# Patient Record
Sex: FEMALE | Race: WHITE | NOT HISPANIC OR LATINO | Employment: STUDENT | URBAN - METROPOLITAN AREA
[De-identification: names, ages, dates, MRNs, and addresses within clinical notes are randomized per-mention and may not be internally consistent; named-entity substitution may affect disease eponyms.]

---

## 2017-03-13 ENCOUNTER — TELEPHONE (OUTPATIENT)
Dept: FAMILY MEDICINE | Facility: CLINIC | Age: 22
End: 2017-03-13

## 2017-03-13 NOTE — TELEPHONE ENCOUNTER
Patient states she went to [facility]  in [place] for swollen tonsils on Sat 3/11/17.  She also states she got a steroid shot and amoxicillin was prescribed.  Advised patient to finish all of her medication.  Patient states urgent care told her to follow up with her PCP.  I explained to patient Dr. Dsouza is only seeing urgent care and appt will be canceled. Patient verbalized understanding.

## 2017-04-12 ENCOUNTER — TELEPHONE (OUTPATIENT)
Dept: FAMILY MEDICINE | Facility: CLINIC | Age: 22
End: 2017-04-12

## 2017-04-12 NOTE — TELEPHONE ENCOUNTER
----- Message from RT Kendra sent at 4/12/2017  4:04 PM CDT -----  Contact: pt    Called pod, pt , returned missed call, thanks.

## 2017-04-12 NOTE — TELEPHONE ENCOUNTER
Returned patient call. No answer. Left message for patient to call back and schedule an appointment

## 2017-04-12 NOTE — TELEPHONE ENCOUNTER
Returned patient call. Advised [facility] has no available appointments on Monday neither did the [facility]. Offered an appointment on Tuesday at the [place] location and patient accepted. Appointment scheduled

## 2017-04-12 NOTE — TELEPHONE ENCOUNTER
----- Message from Antonio Cortes sent at 4/12/2017  3:22 PM CDT -----  Contact: pt  Pt is requesting an appt for Monday(was in ER and not sure what was DX)  Call Back#[phone]  Thanks

## 2017-04-18 ENCOUNTER — OFFICE VISIT (OUTPATIENT)
Dept: FAMILY MEDICINE | Facility: CLINIC | Age: 22
End: 2017-04-18
Payer: COMMERCIAL

## 2017-04-18 ENCOUNTER — DOCUMENTATION ONLY (OUTPATIENT)
Dept: FAMILY MEDICINE | Facility: CLINIC | Age: 22
End: 2017-04-18

## 2017-04-18 VITALS
TEMPERATURE: 98 F | HEIGHT: 65 IN | WEIGHT: 112.19 LBS | SYSTOLIC BLOOD PRESSURE: 104 MMHG | BODY MASS INDEX: 18.69 KG/M2 | DIASTOLIC BLOOD PRESSURE: 73 MMHG | HEART RATE: 99 BPM

## 2017-04-18 DIAGNOSIS — K58.9 IRRITABLE BOWEL SYNDROME, UNSPECIFIED TYPE: Primary | ICD-10-CM

## 2017-04-18 PROCEDURE — 99213 OFFICE O/P EST LOW 20 MIN: CPT | Mod: S$GLB,,, | Performed by: NURSE PRACTITIONER

## 2017-04-18 PROCEDURE — 1160F RVW MEDS BY RX/DR IN RCRD: CPT | Mod: S$GLB,,, | Performed by: NURSE PRACTITIONER

## 2017-04-18 RX ORDER — DICYCLOMINE HYDROCHLORIDE 10 MG/1
10 CAPSULE ORAL 4 TIMES DAILY PRN
Qty: 120 CAPSULE | Refills: 5 | Status: SHIPPED | OUTPATIENT
Start: 2017-04-18 | End: 2017-05-19

## 2017-04-18 NOTE — PROGRESS NOTES
Subjective:       Patient ID: Elvia Alvarez is a 22 y.o. female.    Chief Complaint: ER follow-up  Had a diarrhea and nausea bout for 2 weeks, was seen in [facility] ED, no treatment given, was tested for C diff, but it came back negative, CBC and CMP were also normal. Had U/A done and it was normal as well. She is better now, denies any further diarrhea or cramping, but does have chronic stomach cramping for her whole life, worse when she is very stressed. It is accompanied by bloating. She has never taken anything for it, but when she lays down on her stomach for about an hour or so, it goes away. No other family members have stomach issues.    HPI  Review of Systems   Gastrointestinal: Positive for abdominal pain and blood in stool. Negative for constipation and diarrhea.       Objective:      Physical Exam   Constitutional: She is oriented to person, place, and time. She appears well-developed and well-nourished. No distress.   HENT:   Head: Normocephalic and atraumatic.   Eyes: Conjunctivae are normal. Right eye exhibits no discharge. Left eye exhibits no discharge. No scleral icterus.   Cardiovascular: Normal rate, regular rhythm and normal heart sounds.  Exam reveals no gallop and no friction rub.    No murmur heard.  Pulmonary/Chest: Effort normal and breath sounds normal. No respiratory distress. She has no wheezes. She has no rales.   Abdominal: Soft. Bowel sounds are normal. She exhibits no distension and no mass. There is no tenderness. There is no rebound and no guarding.   Neurological: She is alert and oriented to person, place, and time.   Skin: Skin is warm and dry. She is not diaphoretic.   Psychiatric: She has a normal mood and affect. Her behavior is normal.   Nursing note and vitals reviewed.      Assessment:       1. Irritable bowel syndrome, unspecified type        Plan:       Irritable bowel syndrome, unspecified type  -     dicyclomine (BENTYL) 10 MG capsule; Take 1 capsule (10 mg total) by  mouth 4 (four) times daily as needed.  Dispense: 120 capsule; Refill: 5       Probable infectious colitis from a virus. If any change in bowel habit or any further blood in the stool, will refer to GI.

## 2017-04-18 NOTE — MR AVS SNAPSHOT
[facility]  [address] [address] [address]  Phone: [phone]  Fax: [phone]                  Elvia Alvarez   2017 1:00 PM   Office Visit    Description:  Female : 1995   Provider:  JOELLEN Louis   Department:  [facility]           Reason for Visit     ER follow-up           Diagnoses this Visit        Comments    Irritable bowel syndrome, unspecified type    -  Primary            To Do List           Future Appointments        Provider Department Dept Phone    2017 1:15 PM Freddie Hayes III, MD [facility] - Otorhinolaryngology [phone]      Goals (5 Years of Data)     None      Follow-Up and Disposition     Return if symptoms worsen or fail to improve.    Follow-up and Disposition History       These Medications        Disp Refills Start End    dicyclomine (BENTYL) 10 MG capsule 120 capsule 5 2017    Take 1 capsule (10 mg total) by mouth 4 (four) times daily as needed. - Oral    Pharmacy: [facility]/pharmacy #[id number] - [place], [place] [address] Ph #: [phone]         [facility]     [facility] On Call Nurse Care Line -  Assistance  Unless otherwise directed by your provider, please contact [facility], our nurse care line that is available for  assistance.     Registered nurses in the [facility] provide: appointment scheduling, clinical advisement, health education, and other advisory services.  Call: [phone] (toll free)               Medications           Message regarding Medications     Verify the changes and/or additions to your medication regime listed below are the same as discussed with your clinician today.  If any of these changes or additions are incorrect, please notify your healthcare provider.        START taking these NEW medications        Refills    dicyclomine (BENTYL) 10 MG capsule 5    Sig: Take 1 capsule (10 mg total) by mouth 4 (four) times  "daily as needed.    Class: Normal    Route: Oral           Verify that the below list of medications is an accurate representation of the medications you are currently taking.  If none reported, the list may be blank. If incorrect, please contact your healthcare provider. Carry this list with you in case of emergency.           Current Medications     SRONYX 0.1-20 mg-mcg per tablet Take 1 tablet by mouth once daily.    dicyclomine (BENTYL) 10 MG capsule Take 1 capsule (10 mg total) by mouth 4 (four) times daily as needed.           Clinical Reference Information           Your Vitals Were     BP Pulse Temp Height Weight Last Period    104/73 (BP Location: Right arm, Patient Position: Sitting, BP Method: Automatic) 99 98.1 °F (36.7 °C) (Oral) 5' 5" (1.651 m) 50.9 kg (112 lb 3.2 oz) 03/22/2017    BMI                18.67 kg/m2          Blood Pressure          Most Recent Value    BP  104/73      Allergies as of 4/18/2017     No Known Allergies      Immunizations Administered on Date of Encounter - 4/18/2017     None      Language Assistance Services     ATTENTION: Language assistance services are available, free of charge. Please call [phone].      ATENCIÓN: Si habla español, tiene a sellers disposición servicios gratuitos de asistencia lingüística. Llame al [phone].     MICHAEL Ý: N?u b?n nói Ti?ng Vi?t, có các d?ch v? h? tr? ngôn ng? mi?n phí dành cho b?n. G?i s? [phone].         [facility] complies with applicable Federal civil rights laws and does not discriminate on the basis of race, color, national origin, age, disability, or sex.        "

## 2017-04-18 NOTE — PROGRESS NOTES
Health Maintenance Due   Topic Date Due    HPV Vaccines (1 of 3 - Female 3 Dose Series) 03/09/2005    TETANUS VACCINE  03/09/2012    Pap Smear  03/09/2015    Influenza Vaccine  08/01/2016

## 2017-05-04 ENCOUNTER — DOCUMENTATION ONLY (OUTPATIENT)
Dept: FAMILY MEDICINE | Facility: CLINIC | Age: 22
End: 2017-05-04

## 2017-05-04 NOTE — PROGRESS NOTES
Pre-Visit Chart Review  For Appointment Scheduled on 5/5/17.    Health Maintenance Due   Topic Date Due    HPV Vaccines (1 of 3 - Female 3 Dose Series) 03/09/2005    TETANUS VACCINE  03/09/2012    Pap Smear  03/09/2015

## 2017-05-05 ENCOUNTER — OFFICE VISIT (OUTPATIENT)
Dept: FAMILY MEDICINE | Facility: CLINIC | Age: 22
End: 2017-05-05
Payer: COMMERCIAL

## 2017-05-05 ENCOUNTER — LAB VISIT (OUTPATIENT)
Dept: LAB | Facility: HOSPITAL | Age: 22
End: 2017-05-05
Attending: FAMILY MEDICINE
Payer: COMMERCIAL

## 2017-05-05 VITALS
DIASTOLIC BLOOD PRESSURE: 88 MMHG | HEIGHT: 65 IN | SYSTOLIC BLOOD PRESSURE: 128 MMHG | WEIGHT: 114.44 LBS | BODY MASS INDEX: 19.07 KG/M2 | RESPIRATION RATE: 18 BRPM | HEART RATE: 91 BPM | TEMPERATURE: 98 F

## 2017-05-05 DIAGNOSIS — F32.A DEPRESSION, UNSPECIFIED DEPRESSION TYPE: ICD-10-CM

## 2017-05-05 DIAGNOSIS — F41.1 GAD (GENERALIZED ANXIETY DISORDER): ICD-10-CM

## 2017-05-05 DIAGNOSIS — Z29.9 PREVENTIVE MEASURE: ICD-10-CM

## 2017-05-05 DIAGNOSIS — Z29.9 PREVENTIVE MEASURE: Primary | ICD-10-CM

## 2017-05-05 LAB
25(OH)D3+25(OH)D2 SERPL-MCNC: 63 NG/ML
ALBUMIN SERPL BCP-MCNC: 4.3 G/DL
ALP SERPL-CCNC: 57 U/L
ALT SERPL W/O P-5'-P-CCNC: 9 U/L
ANION GAP SERPL CALC-SCNC: 9 MMOL/L
AST SERPL-CCNC: 15 U/L
BASOPHILS # BLD AUTO: 0.02 K/UL
BASOPHILS NFR BLD: 0.2 %
BILIRUB SERPL-MCNC: 0.3 MG/DL
BUN SERPL-MCNC: 10 MG/DL
CALCIUM SERPL-MCNC: 9.9 MG/DL
CHLORIDE SERPL-SCNC: 103 MMOL/L
CHOLEST/HDLC SERPL: 4 {RATIO}
CO2 SERPL-SCNC: 29 MMOL/L
CREAT SERPL-MCNC: 0.8 MG/DL
DIFFERENTIAL METHOD: NORMAL
EOSINOPHIL # BLD AUTO: 0.3 K/UL
EOSINOPHIL NFR BLD: 2.8 %
ERYTHROCYTE [DISTWIDTH] IN BLOOD BY AUTOMATED COUNT: 12.9 %
EST. GFR  (AFRICAN AMERICAN): >60 ML/MIN/1.73 M^2
EST. GFR  (NON AFRICAN AMERICAN): >60 ML/MIN/1.73 M^2
GLUCOSE SERPL-MCNC: 80 MG/DL
HCT VFR BLD AUTO: 41.9 %
HDL/CHOLESTEROL RATIO: 24.7 %
HDLC SERPL-MCNC: 170 MG/DL
HDLC SERPL-MCNC: 42 MG/DL
HGB BLD-MCNC: 14.3 G/DL
LDLC SERPL CALC-MCNC: 102 MG/DL
LYMPHOCYTES # BLD AUTO: 2.3 K/UL
LYMPHOCYTES NFR BLD: 25.3 %
MCH RBC QN AUTO: 29.1 PG
MCHC RBC AUTO-ENTMCNC: 34.1 %
MCV RBC AUTO: 85 FL
MONOCYTES # BLD AUTO: 0.7 K/UL
MONOCYTES NFR BLD: 8 %
NEUTROPHILS # BLD AUTO: 5.8 K/UL
NEUTROPHILS NFR BLD: 63.6 %
NONHDLC SERPL-MCNC: 128 MG/DL
PLATELET # BLD AUTO: 311 K/UL
PMV BLD AUTO: 10.1 FL
POTASSIUM SERPL-SCNC: 4.2 MMOL/L
PROT SERPL-MCNC: 8 G/DL
RBC # BLD AUTO: 4.92 M/UL
SODIUM SERPL-SCNC: 141 MMOL/L
TRIGL SERPL-MCNC: 130 MG/DL
WBC # BLD AUTO: 9.08 K/UL

## 2017-05-05 PROCEDURE — 86703 HIV-1/HIV-2 1 RESULT ANTBDY: CPT

## 2017-05-05 PROCEDURE — 99214 OFFICE O/P EST MOD 30 MIN: CPT | Mod: S$GLB,,, | Performed by: FAMILY MEDICINE

## 2017-05-05 PROCEDURE — 99999 PR PBB SHADOW E&M-EST. PATIENT-LVL III: CPT | Mod: PBBFAC,,, | Performed by: FAMILY MEDICINE

## 2017-05-05 PROCEDURE — 80053 COMPREHEN METABOLIC PANEL: CPT

## 2017-05-05 PROCEDURE — 85025 COMPLETE CBC W/AUTO DIFF WBC: CPT

## 2017-05-05 PROCEDURE — 86592 SYPHILIS TEST NON-TREP QUAL: CPT

## 2017-05-05 PROCEDURE — 36415 COLL VENOUS BLD VENIPUNCTURE: CPT | Mod: PO

## 2017-05-05 PROCEDURE — 1160F RVW MEDS BY RX/DR IN RCRD: CPT | Mod: S$GLB,,, | Performed by: FAMILY MEDICINE

## 2017-05-05 PROCEDURE — 82306 VITAMIN D 25 HYDROXY: CPT

## 2017-05-05 PROCEDURE — 80061 LIPID PANEL: CPT

## 2017-05-05 RX ORDER — ESCITALOPRAM OXALATE 10 MG/1
10 TABLET ORAL DAILY
Qty: 30 TABLET | Refills: 1 | Status: SHIPPED | OUTPATIENT
Start: 2017-05-05 | End: 2017-07-05 | Stop reason: SDUPTHER

## 2017-05-05 NOTE — MR AVS SNAPSHOT
[facility]  [address]  [place] [address]  Phone: [phone]  Fax: [phone]                  Elvia Alvarez   2017 3:40 PM   Office Visit    Description:  Female : 1995   Provider:  Dago Viveros MD   Department:  [facility] Medicine           Reason for Visit     Anxiety           Diagnoses this Visit        Comments    Preventive measure    -  Primary     OBDULIA (generalized anxiety disorder)         Depression, unspecified depression type                To Do List           Future Appointments        Provider Department Dept Phone    2017 2:20 PM Dago Viveros MD [facility] [phone]      Goals (5 Years of Data)     None       These Medications        Disp Refills Start End    escitalopram oxalate (LEXAPRO) 10 MG tablet 30 tablet 1 2017    Take 1 tablet (10 mg total) by mouth once daily. - Oral    Pharmacy: [facility]/pharmacy #[id number] - [place] - [address]  Ph #: [phone]         [facility]     [facility] On Call Nurse Care Line -  Assistance  Unless otherwise directed by your provider, please contact [facility], our nurse care line that is available for  assistance.     Registered nurses in the [facility] provide: appointment scheduling, clinical advisement, health education, and other advisory services.  Call: [phone] (toll free)               Medications           Message regarding Medications     Verify the changes and/or additions to your medication regime listed below are the same as discussed with your clinician today.  If any of these changes or additions are incorrect, please notify your healthcare provider.        START taking these NEW medications        Refills    escitalopram oxalate (LEXAPRO) 10 MG tablet 1    Sig: Take 1 tablet (10 mg total) by mouth once daily.    Class: Normal    Route: Oral           Verify that the below list of medications is an accurate  "representation of the medications you are currently taking.  If none reported, the list may be blank. If incorrect, please contact your healthcare provider. Carry this list with you in case of emergency.           Current Medications     dicyclomine (BENTYL) 10 MG capsule Take 1 capsule (10 mg total) by mouth 4 (four) times daily as needed.    DM/P-EPHED/ACETAMINOPH/DOXYLAM (NYQUIL ORAL) Take by mouth continuous prn.    SRONYX 0.1-20 mg-mcg per tablet Take 1 tablet by mouth once daily.    escitalopram oxalate (LEXAPRO) 10 MG tablet Take 1 tablet (10 mg total) by mouth once daily.           Clinical Reference Information           Your Vitals Were     BP Pulse Temp Resp Height Weight    128/88 (BP Location: Right arm, Patient Position: Sitting, BP Method: Automatic) 91 98.2 °F (36.8 °C) (Oral) 18 5' 5" (1.651 m) 51.9 kg (114 lb 6.7 oz)    Last Period BMI             03/29/2017 19.04 kg/m2         Blood Pressure          Most Recent Value    BP  128/88      Allergies as of 5/5/2017     No Known Allergies      Immunizations Administered on Date of Encounter - 5/5/2017     None      Orders Placed During Today's Visit     Future Labs/Procedures Expected by Expires    CBC auto differential  5/5/2017 7/4/2018    Comprehensive metabolic panel  5/5/2017 7/4/2018    HIV-1 and HIV-2 antibodies  5/5/2017 7/4/2018    Lipid panel  5/5/2017 7/4/2018    RPR  5/5/2017 7/4/2018    Vitamin D  5/5/2017 7/4/2018      Language Assistance Services     ATTENTION: Language assistance services are available, free of charge. Please call [phone].      ATENCIÓN: Si habla español, tiene a sellers disposición servicios gratuitos de asistencia lingüística. Llame al [phone].     CHÚ Ý: N?u b?n nói Ti?ng Vi?t, có các d?ch v? h? tr? ngôn ng? mi?n phí dành cho b?n. G?i s? [phone].         [facility] complies with applicable Federal civil rights laws and does not discriminate on the basis of race, color, national origin, " age, disability, or sex.

## 2017-05-06 LAB — RPR SER QL: NORMAL

## 2017-05-08 LAB — HIV 1+2 AB+HIV1 P24 AG SERPL QL IA: NEGATIVE

## 2017-05-08 NOTE — PROGRESS NOTES
[facility]  Progress Note    Subjective:       Patient ID: Elvia Alvarez is a 22 y.o. female.    Chief Complaint: Anxiety    HPI22 y.o.female is here today secondary to anxiety and depression.  Patient has overall long history of being anxious and depressed.  Patient also has a history of cutting herself with a razor.  Patient has had suicidal thoughts in the past.  Patient's sister committed suicide.  Patient is complaining of increased anxiety throughout the day, and difficulty in controlling her worry, difficulty with her weight, feeling depressed throughout the day, and problems with personal relationships.  Patient is currently seeing a psychologist.  Psychologist had recommended that patient start on medication.  Patient had been on Lexapro in the past.  Patient discontinue medication secondary to gaining weight.  No further complaints at today's visit.   0Review of Systems   Constitutional: Negative for chills and fever.   HENT: Negative for congestion, ear pain, postnasal drip, rhinorrhea, sneezing and sore throat.    Eyes: Negative for pain and visual disturbance.   Respiratory: Negative for cough, shortness of breath and wheezing.    Cardiovascular: Negative for chest pain, palpitations and leg swelling.   Gastrointestinal: Negative for abdominal pain, constipation, diarrhea, nausea and vomiting.   Endocrine: Negative.    Genitourinary: Negative for dysuria, frequency and urgency.   Musculoskeletal: Negative for arthralgias and myalgias.   Skin: Negative for rash.   Allergic/Immunologic: Negative.    Neurological: Negative for syncope and headaches.   Hematological: Negative.    Psychiatric/Behavioral: Positive for self-injury, sleep disturbance and suicidal ideas. The patient is nervous/anxious.        Objective:      Vitals:    05/05/17 1542   BP: 128/88   BP Location: Right arm   Patient Position: Sitting   BP Method: Automatic   Pulse: 91   Resp: 18   Temp: 98.2 °F (36.8 °C)   TempSrc:  "Oral   Weight: 51.9 kg (114 lb 6.7 oz)   Height: 5' 5" (1.651 m)     Body mass index is 19.04 kg/(m^2).  Physical Exam   Constitutional: She is oriented to person, place, and time. She appears well-developed and well-nourished. No distress.   HENT:   Head: Normocephalic and atraumatic.   Cardiovascular: Normal rate, regular rhythm, normal heart sounds and intact distal pulses.  Exam reveals no gallop and no friction rub.    No murmur heard.  Pulmonary/Chest: Effort normal and breath sounds normal. No respiratory distress. She has no rales.   Abdominal: Soft. She exhibits no distension and no mass. There is no rebound and no guarding. No hernia.   Musculoskeletal: Normal range of motion.   Neurological: She is alert and oriented to person, place, and time.   Skin: Skin is warm.   Small area of 3 cuts right abdomen    Psychiatric: She has a normal mood and affect. Her behavior is normal. Judgment and thought content normal.   Vitals reviewed.      Assessment:       1. Preventive measure    2. OBDULIA (generalized anxiety disorder)    3. Depression, unspecified depression type        Plan:       Preventive measure  -     CBC auto differential; Future; Expected date: 5/5/17  -     Comprehensive metabolic panel; Future; Expected date: 5/5/17  -     HIV-1 and HIV-2 antibodies; Future; Expected date: 5/5/17  -     RPR; Future; Expected date: 5/5/17  -     Lipid panel; Future; Expected date: 5/5/17  -     Vitamin D; Future; Expected date: 5/5/17    OBDULIA (generalized anxiety disorder)  -     escitalopram oxalate (LEXAPRO) 10 MG tablet; Take 1 tablet (10 mg total) by mouth once daily.  Dispense: 30 tablet; Refill: 1    Depression, unspecified depression type  -     escitalopram oxalate (LEXAPRO) 10 MG tablet; Take 1 tablet (10 mg total) by mouth once daily.  Dispense: 30 tablet; Refill: 1      Return in about 2 weeks (around 5/19/2017).  Dago Viveros MD  [facility]  5/8/2017 8:21 AM       "

## 2017-05-18 ENCOUNTER — DOCUMENTATION ONLY (OUTPATIENT)
Dept: FAMILY MEDICINE | Facility: CLINIC | Age: 22
End: 2017-05-18

## 2017-05-18 NOTE — PROGRESS NOTES
Pre-Visit Chart Review  For Appointment Scheduled on   5/19/17.    Health Maintenance Due   Topic Date Due    HPV Vaccines (1 of 3 - Female 3 Dose Series) 03/09/2005    Pap Smear  03/09/2015

## 2017-05-19 ENCOUNTER — OFFICE VISIT (OUTPATIENT)
Dept: FAMILY MEDICINE | Facility: CLINIC | Age: 22
End: 2017-05-19
Payer: COMMERCIAL

## 2017-05-19 VITALS
BODY MASS INDEX: 19.17 KG/M2 | TEMPERATURE: 98 F | WEIGHT: 115.06 LBS | HEIGHT: 65 IN | HEART RATE: 89 BPM | SYSTOLIC BLOOD PRESSURE: 117 MMHG | RESPIRATION RATE: 18 BRPM | DIASTOLIC BLOOD PRESSURE: 76 MMHG

## 2017-05-19 DIAGNOSIS — F32.A DEPRESSION, UNSPECIFIED DEPRESSION TYPE: ICD-10-CM

## 2017-05-19 DIAGNOSIS — F41.1 GAD (GENERALIZED ANXIETY DISORDER): Primary | ICD-10-CM

## 2017-05-19 PROCEDURE — 99213 OFFICE O/P EST LOW 20 MIN: CPT | Mod: S$GLB,,, | Performed by: FAMILY MEDICINE

## 2017-05-19 PROCEDURE — 99999 PR PBB SHADOW E&M-EST. PATIENT-LVL III: CPT | Mod: PBBFAC,,, | Performed by: FAMILY MEDICINE

## 2017-05-19 PROCEDURE — 1160F RVW MEDS BY RX/DR IN RCRD: CPT | Mod: S$GLB,,, | Performed by: FAMILY MEDICINE

## 2017-05-19 NOTE — MR AVS SNAPSHOT
[facility]  [address]  [place] [address]  Phone: [phone]  Fax: [phone]                  Elvia Alvarez   2017 2:20 PM   Office Visit    Description:  Female : 1995   Provider:  Dago Viveros MD   Department:  [facility]           Reason for Visit     Follow-up                To Do List           Future Appointments        Provider Department Dept Phone    2017 2:00 PM Dago Viveros MD [facility] [phone]      Goals (5 Years of Data)     None      [facility]     [facility] On Call Nurse Care Line -  Assistance  Unless otherwise directed by your provider, please contact [facility], our nurse care line that is available for  assistance.     Registered nurses in the [facility] On Call Center provide: appointment scheduling, clinical advisement, health education, and other advisory services.  Call: [phone] (toll free)               Medications           Message regarding Medications     Verify the changes and/or additions to your medication regime listed below are the same as discussed with your clinician today.  If any of these changes or additions are incorrect, please notify your healthcare provider.        STOP taking these medications     DM/P-EPHED/ACETAMINOPH/DOXYLAM (NYQUIL ORAL) Take by mouth continuous prn.           Verify that the below list of medications is an accurate representation of the medications you are currently taking.  If none reported, the list may be blank. If incorrect, please contact your healthcare provider. Carry this list with you in case of emergency.           Current Medications     dicyclomine (BENTYL) 10 MG capsule Take 1 capsule (10 mg total) by mouth 4 (four) times daily as needed.    escitalopram oxalate (LEXAPRO) 10 MG tablet Take 1 tablet (10 mg total) by mouth once daily.    SRONYX 0.1-20 mg-mcg per tablet Take 1 tablet by mouth once daily.           Clinical  "Reference Information           Your Vitals Were     BP Pulse Temp Resp Height Weight    117/76 (BP Location: Right arm, Patient Position: Sitting, BP Method: Automatic) 89 98.3 °F (36.8 °C) (Oral) 18 5' 5" (1.651 m) 52.2 kg (115 lb 1.3 oz)    Last Period BMI             03/29/2017 19.15 kg/m2         Blood Pressure          Most Recent Value    BP  117/76      Allergies as of 5/19/2017     No Known Allergies      Immunizations Administered on Date of Encounter - 5/19/2017     None      Language Assistance Services     ATTENTION: Language assistance services are available, free of charge. Please call [phone].      ATENCIÓN: Si mary pearl, tiene a sellers disposición servicios gratuitos de asistencia lingüística. Llame al [phone].     MICHAEL Ý: N?u b?n nói Ti?ng Vi?t, có các d?ch v? h? tr? ngôn ng? mi?n phí dành cho b?n. G?i s? [phone].         [facility] complies with applicable Federal civil rights laws and does not discriminate on the basis of race, color, national origin, age, disability, or sex.        "

## 2017-05-24 NOTE — PROGRESS NOTES
"[facility]  Progress Note    Subjective:       Patient ID: Elvia Alvarez is a 22 y.o. female.    Chief Complaint: depression follow up     HPI22 y.o.female is here today for depression and generalized anxiety disorder follow-up.  Patient was started on Lexapro 10 mg at last appointment.  Since that time patient feels much better.  Patient's mood has increased significantly.  Patient has less symptoms of anxiety.  Patient has not had any episodes of conflicting self harm.  Patient has history of cutting herself.  Patient is tolerating medication well.  Patient denies any side effects.  No acute complaints at today's visit.  Review of Systems   Constitutional: Negative for chills and fever.   HENT: Negative for congestion, sneezing and sore throat.    Eyes: Negative for visual disturbance.   Respiratory: Negative for cough, shortness of breath and wheezing.    Cardiovascular: Negative for chest pain.   Gastrointestinal: Negative for abdominal pain, constipation, diarrhea, nausea and vomiting.   Genitourinary: Negative for difficulty urinating.   Musculoskeletal: Negative for back pain and myalgias.   Skin: Negative for rash.   Neurological: Negative for dizziness and weakness.   Psychiatric/Behavioral: Negative.  The patient is not nervous/anxious.        Objective:      Vitals:    05/19/17 1443   BP: 117/76   BP Location: Right arm   Patient Position: Sitting   BP Method: Automatic   Pulse: 89   Resp: 18   Temp: 98.3 °F (36.8 °C)   TempSrc: Oral   Weight: 52.2 kg (115 lb 1.3 oz)   Height: 5' 5" (1.651 m)     Body mass index is 19.15 kg/m².  Physical Exam   Constitutional: She is oriented to person, place, and time. She appears well-developed and well-nourished. No distress.   HENT:   Head: Normocephalic and atraumatic.   Cardiovascular: Normal rate, regular rhythm, normal heart sounds and intact distal pulses.  Exam reveals no gallop and no friction rub.    No murmur heard.  Pulmonary/Chest: Effort " normal and breath sounds normal. No respiratory distress. She has no rales.   Abdominal: Soft. She exhibits no distension and no mass. There is no rebound and no guarding. No hernia.   Musculoskeletal: Normal range of motion.   Neurological: She is alert and oriented to person, place, and time.   Skin: Skin is warm.   Psychiatric: She has a normal mood and affect. Her behavior is normal. Judgment and thought content normal.   Vitals reviewed.      Assessment:       1. OBDULIA (generalized anxiety disorder)    2. Depression, unspecified depression type        Plan:       OBDULIA (generalized anxiety disorder) / Depression, unspecified depression type  -continue lexapro  -fish oil 1-2 grams daily  -sos help for emotions  -daily exercise    Return in about 4 weeks (around 6/16/2017).  Dago Viveros MD  [facility]  5/24/2017 9:33 AM

## 2017-06-30 ENCOUNTER — TELEPHONE (OUTPATIENT)
Dept: FAMILY MEDICINE | Facility: CLINIC | Age: 22
End: 2017-06-30

## 2017-06-30 ENCOUNTER — DOCUMENTATION ONLY (OUTPATIENT)
Dept: FAMILY MEDICINE | Facility: CLINIC | Age: 22
End: 2017-06-30

## 2017-06-30 NOTE — PROGRESS NOTES
Pre-Visit Chart Review  For Appointment Scheduled on 7/3/17.    Health Maintenance Due   Topic Date Due    HPV Vaccines (1 of 3 - Female 3 Dose Series) 03/09/2005    Pap Smear  03/09/2015

## 2017-06-30 NOTE — PROGRESS NOTES
Pre-Visit Chart Review  For Appointment Scheduled on 07/07/2017    Health Maintenance Due   Topic Date Due    HPV Vaccines (1 of 3 - Female 3 Dose Series) 03/09/2005    Pap Smear  03/09/2015

## 2017-06-30 NOTE — TELEPHONE ENCOUNTER
Informed patinet that provider will be out that day and appointment needed to be rescheduled. Per patient request, rescheduled appointment.

## 2017-07-05 ENCOUNTER — OFFICE VISIT (OUTPATIENT)
Dept: FAMILY MEDICINE | Facility: CLINIC | Age: 22
End: 2017-07-05
Payer: COMMERCIAL

## 2017-07-05 VITALS
TEMPERATURE: 98 F | BODY MASS INDEX: 19.62 KG/M2 | HEIGHT: 65 IN | WEIGHT: 117.75 LBS | DIASTOLIC BLOOD PRESSURE: 75 MMHG | SYSTOLIC BLOOD PRESSURE: 114 MMHG | HEART RATE: 90 BPM

## 2017-07-05 DIAGNOSIS — F41.1 GAD (GENERALIZED ANXIETY DISORDER): ICD-10-CM

## 2017-07-05 DIAGNOSIS — F32.A DEPRESSION, UNSPECIFIED DEPRESSION TYPE: ICD-10-CM

## 2017-07-05 PROCEDURE — 99999 PR PBB SHADOW E&M-EST. PATIENT-LVL III: CPT | Mod: PBBFAC,,, | Performed by: FAMILY MEDICINE

## 2017-07-05 PROCEDURE — 99213 OFFICE O/P EST LOW 20 MIN: CPT | Mod: S$GLB,,, | Performed by: FAMILY MEDICINE

## 2017-07-05 RX ORDER — ESCITALOPRAM OXALATE 10 MG/1
10 TABLET ORAL DAILY
Qty: 90 TABLET | Refills: 3 | Status: SHIPPED | OUTPATIENT
Start: 2017-07-05 | End: 2017-07-12 | Stop reason: SDUPTHER

## 2017-07-05 NOTE — PROGRESS NOTES
"[facility]  Progress Note    Subjective:       Patient ID: Elvia Alvarez is a 22 y.o. female.    Chief Complaint: OBDULIA/Depression follow up     HPI22 y.o.female is here today for generalized anxiety disorder/depression follow-up.  Patient has been taking Lexapro 10 mg daily.  Patient feels that Lexapro has significantly helped her symptoms.  Patient's fiancé also feels that it has helped with her mood.  Patient is not expressing any side effects from the medication.  No acute complaints at today's visit.  Review of Systems   Constitutional: Negative for chills and fever.   HENT: Negative for congestion, sneezing and sore throat.    Eyes: Negative for visual disturbance.   Respiratory: Negative for cough, shortness of breath and wheezing.    Cardiovascular: Negative for chest pain.   Gastrointestinal: Negative for abdominal pain, constipation, diarrhea, nausea and vomiting.   Genitourinary: Negative for difficulty urinating.   Musculoskeletal: Negative for back pain and myalgias.   Skin: Negative for rash.   Neurological: Negative for dizziness and weakness.   Psychiatric/Behavioral: Negative.  The patient is not nervous/anxious.        Objective:      Vitals:    07/05/17 1525 07/05/17 1534   BP: 114/81 114/75   BP Location: Right arm    Patient Position: Sitting    BP Method: Automatic    Pulse: 90    Temp: 98.3 °F (36.8 °C)    TempSrc: Oral    Weight: 53.4 kg (117 lb 11.6 oz)    Height: 5' 5" (1.651 m)      Body mass index is 19.59 kg/m².  Physical Exam   Constitutional: She is oriented to person, place, and time. She appears well-developed and well-nourished.   HENT:   Head: Normocephalic and atraumatic.   Eyes: Conjunctivae and EOM are normal. Pupils are equal, round, and reactive to light.   Neck: Normal range of motion. Neck supple. No JVD present.   Cardiovascular: Normal rate, regular rhythm, normal heart sounds and intact distal pulses.  Exam reveals no gallop and no friction rub.    No murmur " heard.  Pulmonary/Chest: Effort normal. No respiratory distress. She has no wheezes.   Abdominal: Soft. Bowel sounds are normal. There is no tenderness. There is no rebound and no guarding.   Musculoskeletal: Normal range of motion.   Neurological: She is alert and oriented to person, place, and time. No cranial nerve deficit.   Skin: Skin is warm and dry.   Psychiatric: She has a normal mood and affect. Her behavior is normal. Judgment and thought content normal.   Nursing note and vitals reviewed.      Assessment:       1. OBDULIA (generalized anxiety disorder)    2. Depression, unspecified depression type        Plan:       OBDULIA (generalized anxiety disorder)  -     escitalopram oxalate (LEXAPRO) 10 MG tablet; Take 1 tablet (10 mg total) by mouth once daily.  Dispense: 90 tablet; Refill: 3    Depression, unspecified depression type  -     escitalopram oxalate (LEXAPRO) 10 MG tablet; Take 1 tablet (10 mg total) by mouth once daily.  Dispense: 90 tablet; Refill: 3      Return in about 3 months (around 10/5/2017).  Dago Viveros MD  [facility]  7/5/2017 3:37 PM

## 2017-07-12 DIAGNOSIS — F32.A DEPRESSION, UNSPECIFIED DEPRESSION TYPE: ICD-10-CM

## 2017-07-12 DIAGNOSIS — F41.1 GAD (GENERALIZED ANXIETY DISORDER): ICD-10-CM

## 2017-07-12 RX ORDER — ESCITALOPRAM OXALATE 10 MG/1
10 TABLET ORAL DAILY
Qty: 90 TABLET | Refills: 3 | Status: SHIPPED | OUTPATIENT
Start: 2017-07-12 | End: 2018-04-10 | Stop reason: SDUPTHER

## 2017-08-11 ENCOUNTER — NURSE TRIAGE (OUTPATIENT)
Dept: ADMINISTRATIVE | Facility: CLINIC | Age: 22
End: 2017-08-11

## 2017-08-11 NOTE — TELEPHONE ENCOUNTER
Reason for Disposition   Severe pain with urination    Protocols used: ST URINATION PAIN - FEMALE-A-OH

## 2017-08-11 NOTE — TELEPHONE ENCOUNTER
Phone message for patient; needs appointment. Staff here tomorrow [phone] and to call for assessment

## 2017-08-14 ENCOUNTER — OFFICE VISIT (OUTPATIENT)
Dept: FAMILY MEDICINE | Facility: CLINIC | Age: 22
End: 2017-08-14
Payer: COMMERCIAL

## 2017-08-14 ENCOUNTER — DOCUMENTATION ONLY (OUTPATIENT)
Dept: FAMILY MEDICINE | Facility: CLINIC | Age: 22
End: 2017-08-14

## 2017-08-14 VITALS
DIASTOLIC BLOOD PRESSURE: 73 MMHG | HEIGHT: 65 IN | TEMPERATURE: 98 F | WEIGHT: 125.88 LBS | HEART RATE: 98 BPM | SYSTOLIC BLOOD PRESSURE: 102 MMHG | OXYGEN SATURATION: 100 % | RESPIRATION RATE: 16 BRPM | BODY MASS INDEX: 20.97 KG/M2

## 2017-08-14 DIAGNOSIS — Z30.09 ENCOUNTER FOR OTHER GENERAL COUNSELING OR ADVICE ON CONTRACEPTION: ICD-10-CM

## 2017-08-14 DIAGNOSIS — N30.01 ACUTE CYSTITIS WITH HEMATURIA: ICD-10-CM

## 2017-08-14 DIAGNOSIS — Z32.00 POSSIBLE PREGNANCY: Primary | ICD-10-CM

## 2017-08-14 LAB
B-HCG UR QL: NEGATIVE
CTP QC/QA: YES

## 2017-08-14 PROCEDURE — 81025 URINE PREGNANCY TEST: CPT | Mod: QW,S$GLB,, | Performed by: NURSE PRACTITIONER

## 2017-08-14 PROCEDURE — 99213 OFFICE O/P EST LOW 20 MIN: CPT | Mod: S$GLB,,, | Performed by: NURSE PRACTITIONER

## 2017-08-14 PROCEDURE — 3008F BODY MASS INDEX DOCD: CPT | Mod: S$GLB,,, | Performed by: NURSE PRACTITIONER

## 2017-08-14 RX ORDER — NITROFURANTOIN 25; 75 MG/1; MG/1
100 CAPSULE ORAL
COMMUNITY
Start: 2017-08-12 | End: 2017-08-14

## 2017-08-14 RX ORDER — LEVONORGESTREL AND ETHINYL ESTRADIOL 0.1-0.02MG
1 KIT ORAL DAILY
Qty: 28 TABLET | Refills: 11 | Status: SHIPPED | OUTPATIENT
Start: 2017-08-14 | End: 2018-04-10 | Stop reason: SDUPTHER

## 2017-08-14 RX ORDER — CIPROFLOXACIN 250 MG/1
250 TABLET, FILM COATED ORAL 2 TIMES DAILY
Qty: 10 TABLET | Refills: 0 | Status: SHIPPED | OUTPATIENT
Start: 2017-08-14 | End: 2017-08-19

## 2017-08-14 RX ORDER — PHENAZOPYRIDINE HYDROCHLORIDE 100 MG/1
100 TABLET, FILM COATED ORAL
COMMUNITY
Start: 2017-08-12 | End: 2017-08-15

## 2017-08-14 NOTE — PROGRESS NOTES
Health Maintenance Due   Topic Date Due    HPV Vaccines (1 of 3 - Female 3 Dose Series) 03/09/2005    Influenza Vaccine  08/01/2017

## 2017-08-14 NOTE — PROGRESS NOTES
Subjective:       Patient ID: Elvia Alvarez is a 22 y.o. female.    Chief Complaint: Back Pain  Thought she had a UTI, started about a week ago, had some bleeding with it. Drank a lot of water and thought it would go away, but then woke up Saturday night with a severe pain in her left lower back. She went to ER in [place] and was told she had a UTI with pyelonephritis. Still has low back pain and has been laying on a heating pad. She was given macrobid. She is not on birth control pills at this time.   HPI  Review of Systems   Genitourinary: Negative for dysuria.   Musculoskeletal: Positive for back pain.       Objective:    Urine pregnancy negative  Physical Exam   Constitutional: She is oriented to person, place, and time. She appears well-developed and well-nourished. No distress.   HENT:   Head: Normocephalic and atraumatic.   Eyes: Conjunctivae are normal. Right eye exhibits no discharge. Left eye exhibits no discharge. No scleral icterus.   Cardiovascular: Normal rate, regular rhythm and normal heart sounds.  Exam reveals no gallop and no friction rub.    No murmur heard.  Pulmonary/Chest: Effort normal and breath sounds normal. No respiratory distress. She has no wheezes. She has no rales.   Musculoskeletal:   No tenderness with percussion of spine. Had moderate tenderness with percussion of left CVA.    Neurological: She is alert and oriented to person, place, and time.   Skin: Skin is warm and dry. She is not diaphoretic.   Psychiatric: She has a normal mood and affect. Her behavior is normal.   Nursing note and vitals reviewed.      Assessment:       1. Possible pregnancy    2. Acute cystitis with hematuria    3. Encounter for other general counseling or advice on contraception        Plan:       Possible pregnancy  -     POCT Urine Pregnancy    Acute cystitis with hematuria  -     ciprofloxacin HCl (CIPRO) 250 MG tablet; Take 1 tablet (250 mg total) by mouth 2 (two) times daily.  Dispense: 10 tablet;  Refill: 0    Encounter for other general counseling or advice on contraception  -     levonorgestrel-ethinyl estradiol (SRONYX) 0.1-20 mg-mcg per tablet; Take 1 tablet by mouth once daily.  Dispense: 28 tablet; Refill: 11    Stop the nitrofurantoin.

## 2017-09-18 DIAGNOSIS — Z30.09 ENCOUNTER FOR OTHER GENERAL COUNSELING OR ADVICE ON CONTRACEPTION: ICD-10-CM

## 2017-09-18 RX ORDER — LEVONORGESTREL AND ETHINYL ESTRADIOL 0.1-0.02MG
1 KIT ORAL DAILY
Qty: 28 TABLET | Refills: 11 | OUTPATIENT
Start: 2017-09-18

## 2017-11-22 ENCOUNTER — TELEPHONE (OUTPATIENT)
Dept: FAMILY MEDICINE | Facility: CLINIC | Age: 22
End: 2017-11-22

## 2017-11-22 NOTE — TELEPHONE ENCOUNTER
----- Message from Judy Smith sent at 11/22/2017  8:27 AM CST -----  Contact: self [phone]  She is requesting an appt for today.  She has a sore throat, her neck, back and head hurts.  She also has fever.  Please call her about fitting her in.  Thank you!

## 2017-11-22 NOTE — TELEPHONE ENCOUNTER
Left message informing patient that there were no available appointments today in the clinic and to call back to see if there were any cancellations.

## 2018-04-09 ENCOUNTER — DOCUMENTATION ONLY (OUTPATIENT)
Dept: FAMILY MEDICINE | Facility: CLINIC | Age: 23
End: 2018-04-09

## 2018-04-09 ENCOUNTER — OFFICE VISIT (OUTPATIENT)
Dept: FAMILY MEDICINE | Facility: CLINIC | Age: 23
End: 2018-04-09
Payer: COMMERCIAL

## 2018-04-09 VITALS
RESPIRATION RATE: 16 BRPM | OXYGEN SATURATION: 100 % | TEMPERATURE: 98 F | DIASTOLIC BLOOD PRESSURE: 78 MMHG | WEIGHT: 124.56 LBS | BODY MASS INDEX: 20.75 KG/M2 | SYSTOLIC BLOOD PRESSURE: 114 MMHG | HEIGHT: 65 IN | HEART RATE: 94 BPM

## 2018-04-09 DIAGNOSIS — J20.9 ACUTE BRONCHITIS, UNSPECIFIED ORGANISM: Primary | ICD-10-CM

## 2018-04-09 DIAGNOSIS — J02.9 PHARYNGITIS, UNSPECIFIED ETIOLOGY: ICD-10-CM

## 2018-04-09 PROCEDURE — 99213 OFFICE O/P EST LOW 20 MIN: CPT | Mod: S$GLB,,, | Performed by: INTERNAL MEDICINE

## 2018-04-09 RX ORDER — SUCRALFATE 1 G/1
TABLET ORAL
Qty: 32 TABLET | Refills: 0 | Status: SHIPPED | OUTPATIENT
Start: 2018-04-09 | End: 2021-08-17

## 2018-04-09 RX ORDER — BENZONATATE 200 MG/1
200 CAPSULE ORAL 3 TIMES DAILY PRN
Qty: 30 CAPSULE | Refills: 1 | Status: SHIPPED | OUTPATIENT
Start: 2018-04-09 | End: 2018-04-19

## 2018-04-09 RX ORDER — PREDNISONE 20 MG/1
40 TABLET ORAL DAILY
Qty: 4 TABLET | Refills: 0 | Status: SHIPPED | OUTPATIENT
Start: 2018-04-09 | End: 2018-04-11

## 2018-04-09 NOTE — PATIENT INSTRUCTIONS
Cool mist vaporizor.  Hot fluids. Hot tea with lemon and honey.    For best results take both the Tessalon Perles and Robitussin-DM    Salt water gargles, Chloraseptic lozenges

## 2018-04-09 NOTE — PROGRESS NOTES
"Subjective:       Patient ID: Elvia Alvarez is a 23 y.o. female.    Chief Complaint: Sore Throat (mucus with blood); Cough; and Fever (yesterday)    HPI         CHIEF COMPLAINT: Cough(+).  HPI: exposed to strep throat.     ONSET/TIMINd   ago    DURATION:               Paroxysmal: no .    QUALITY/COURSE:. unchanged    INTENSITY/SEVERITY: Severity is #  5 (10 point scale)      The following symptoms/statements are positive if BOLD, negative otherwise.      CONTEXT/WHEN:  Tobacco_use. Smokers_in_home. Seasonal_pattern. Allergies/Hayfever. Sinusitis. Irritant_Exposure(smoke/dust/fumes). Exposure_to_others_with_similar_symptoms.        Similar_problems_in_past.   PAST TREATMENT OR EVALUATION:   previous PPD. Recent_previous_chest_x-ray. Recent_antibiotics.  Associated Symptoms:     sputum production: scant. copious. Hemoptysis?.  Medical History: Past_pulmonary_infections.  Cardiovascular_disease.chronic_lung_disease.  tuberculosis. Asthma. AIDS. Gastroesophageal_reflux_disease .      Review of Systems   Constitutional: Positive for chills, diaphoresis and fever. Negative for unexpected weight change.   HENT: Positive for sore throat. Negative for rhinorrhea and sinus pressure.    Respiratory: Positive for cough. Negative for shortness of breath and wheezing.    Cardiovascular: Negative for chest pain.   Musculoskeletal: Negative for myalgias.       Objective:      Vitals:    18 1302   BP: 114/78   Pulse: 94   Resp: 16   Temp: 98 °F (36.7 °C)   TempSrc: Oral   SpO2: 100%   Weight: 56.5 kg (124 lb 9 oz)   Height: 5' 5" (1.651 m)   PainSc:   6   PainLoc: Throat     Physical Exam   Constitutional: She appears well-developed and well-nourished.   HENT:   Right Ear: External ear normal.   Left Ear: External ear normal.   Mouth/Throat: Oropharynx is clear and moist. No oropharyngeal exudate.   Throat mildly injected without exudates   Cardiovascular: Normal rate, regular rhythm and normal heart sounds.  Exam " reveals no friction rub.    No murmur heard.  Pulmonary/Chest: Effort normal and breath sounds normal. No respiratory distress. She has no wheezes. She has no rales. She exhibits no tenderness.   Abdominal: Soft. Bowel sounds are normal. There is no tenderness.   Musculoskeletal: She exhibits no edema.   Lymphadenopathy:     She has no cervical adenopathy.   Nursing note and vitals reviewed.        Assessment:       1. Acute bronchitis, unspecified organism    2. Pharyngitis, unspecified etiology          Plan:     Acute bronchitis, unspecified organism  -     sucralfate (CARAFATE) 1 gram tablet; 4 dissolved in cranberry juice, gargle and swallow at bedtime as needed for sore throat  Dispense: 32 tablet; Refill: 0  -     benzonatate (TESSALON) 200 MG capsule; Take 1 capsule (200 mg total) by mouth 3 (three) times daily as needed for Cough.  Dispense: 30 capsule; Refill: 1    Pharyngitis, unspecified etiology  -     predniSONE (DELTASONE) 20 MG tablet; Take 2 tablets (40 mg total) by mouth once daily.  Dispense: 4 tablet; Refill: 0      No Follow-up on file.

## 2018-04-10 DIAGNOSIS — F41.1 GAD (GENERALIZED ANXIETY DISORDER): ICD-10-CM

## 2018-04-10 DIAGNOSIS — Z30.09 ENCOUNTER FOR OTHER GENERAL COUNSELING OR ADVICE ON CONTRACEPTION: ICD-10-CM

## 2018-04-10 DIAGNOSIS — F32.A DEPRESSION, UNSPECIFIED DEPRESSION TYPE: ICD-10-CM

## 2018-04-10 RX ORDER — LEVONORGESTREL AND ETHINYL ESTRADIOL 0.1-0.02MG
1 KIT ORAL DAILY
Qty: 28 TABLET | Refills: 11 | Status: SHIPPED | OUTPATIENT
Start: 2018-04-10 | End: 2018-07-10 | Stop reason: SDUPTHER

## 2018-04-10 RX ORDER — ESCITALOPRAM OXALATE 10 MG/1
10 TABLET ORAL DAILY
Qty: 90 TABLET | Refills: 3 | Status: SHIPPED | OUTPATIENT
Start: 2018-04-10 | End: 2021-08-17

## 2018-04-10 NOTE — TELEPHONE ENCOUNTER
----- Message from Jeanine Nath sent at 4/10/2018 10:16 AM CDT -----  Contact: Elvia  Patient is calling as thought was to get five Rx but only picked up three. If she is to get the other two Rx please send to [facility] on [address] in [place]. Please call [phone]. Thanks!

## 2018-07-10 DIAGNOSIS — Z30.09 ENCOUNTER FOR OTHER GENERAL COUNSELING OR ADVICE ON CONTRACEPTION: ICD-10-CM

## 2018-07-10 RX ORDER — LEVONORGESTREL AND ETHINYL ESTRADIOL 0.1-0.02MG
1 KIT ORAL DAILY
Qty: 28 TABLET | Refills: 0 | Status: SHIPPED | OUTPATIENT
Start: 2018-07-10 | End: 2018-08-16 | Stop reason: SDUPTHER

## 2018-08-16 DIAGNOSIS — Z30.09 ENCOUNTER FOR OTHER GENERAL COUNSELING OR ADVICE ON CONTRACEPTION: ICD-10-CM

## 2018-08-16 RX ORDER — LEVONORGESTREL AND ETHINYL ESTRADIOL 0.1-0.02MG
1 KIT ORAL DAILY
Qty: 28 TABLET | Refills: 0 | Status: SHIPPED | OUTPATIENT
Start: 2018-08-16 | End: 2021-08-17

## 2018-09-13 DIAGNOSIS — Z30.09 ENCOUNTER FOR OTHER GENERAL COUNSELING OR ADVICE ON CONTRACEPTION: ICD-10-CM

## 2018-09-13 RX ORDER — LEVONORGESTREL AND ETHINYL ESTRADIOL 0.1-0.02MG
1 KIT ORAL DAILY
Qty: 28 TABLET | Refills: 0 | OUTPATIENT
Start: 2018-09-13

## 2018-09-17 ENCOUNTER — TELEPHONE (OUTPATIENT)
Dept: FAMILY MEDICINE | Facility: CLINIC | Age: 23
End: 2018-09-17

## 2018-09-17 NOTE — TELEPHONE ENCOUNTER
----- Message from Griselda Paige MA sent at 9/14/2018  8:23 AM CDT -----  Contact: self   Patient needs refill does she need an appt before?        SRONYX 0.1-20 mg-mcg per tablet 28 tablet 0 8/16/2018    Sig - Route: TAKE 1 TABLET BY MOUTH ONCE DAILY. - Oral   Sent to pharmacy as: SRONYX 0.1-20 mg-mcg per tablet   E-Prescribing Status: Receipt confirmed by pharmacy (8/16/2018  9:31 AM CDT)       ----- Message -----  From: Karine Rees  Sent: 9/13/2018   4:53 PM  To: Ki [facility] Staff    Patient need a refill on her birth control, please send to [facility].  Please call back at [phone] (home)           [facility]/pharmacy #[id number] - [place] - [address] [address]  Phone: [phone] Fax: [phone]

## 2018-09-18 ENCOUNTER — PATIENT MESSAGE (OUTPATIENT)
Dept: FAMILY MEDICINE | Facility: CLINIC | Age: 23
End: 2018-09-18

## 2018-09-18 DIAGNOSIS — Z30.09 ENCOUNTER FOR OTHER GENERAL COUNSELING OR ADVICE ON CONTRACEPTION: ICD-10-CM

## 2018-09-18 RX ORDER — LEVONORGESTREL AND ETHINYL ESTRADIOL 0.1-0.02MG
1 KIT ORAL DAILY
Qty: 28 TABLET | Refills: 0 | OUTPATIENT
Start: 2018-09-18

## 2018-09-21 NOTE — TELEPHONE ENCOUNTER
Spoke with patient about needing an appt. to receive her birthcontrol medication. She was fine with that. She said she would call us back when she finds out when her next day off is.

## 2020-02-14 ENCOUNTER — TELEPHONE (OUTPATIENT)
Dept: FAMILY MEDICINE | Facility: CLINIC | Age: 25
End: 2020-02-14

## 2020-02-14 ENCOUNTER — OFFICE VISIT (OUTPATIENT)
Dept: FAMILY MEDICINE | Facility: CLINIC | Age: 25
End: 2020-02-14
Payer: COMMERCIAL

## 2020-02-14 VITALS
WEIGHT: 119.06 LBS | BODY MASS INDEX: 19.83 KG/M2 | DIASTOLIC BLOOD PRESSURE: 60 MMHG | OXYGEN SATURATION: 98 % | SYSTOLIC BLOOD PRESSURE: 110 MMHG | HEIGHT: 65 IN | TEMPERATURE: 98 F | HEART RATE: 84 BPM

## 2020-02-14 DIAGNOSIS — F41.9 ANXIETY AND DEPRESSION: ICD-10-CM

## 2020-02-14 DIAGNOSIS — F32.A ANXIETY AND DEPRESSION: ICD-10-CM

## 2020-02-14 DIAGNOSIS — R41.840 ATTENTION OR CONCENTRATION DEFICIT: Primary | ICD-10-CM

## 2020-02-14 DIAGNOSIS — Z86.59 HISTORY OF EATING DISORDER: ICD-10-CM

## 2020-02-14 PROCEDURE — 99214 PR OFFICE/OUTPT VISIT, EST, LEVL IV, 30-39 MIN: ICD-10-PCS | Mod: S$GLB,,, | Performed by: NURSE PRACTITIONER

## 2020-02-14 PROCEDURE — 99214 OFFICE O/P EST MOD 30 MIN: CPT | Mod: S$GLB,,, | Performed by: NURSE PRACTITIONER

## 2020-02-14 PROCEDURE — 3008F PR BODY MASS INDEX (BMI) DOCUMENTED: ICD-10-PCS | Mod: CPTII,S$GLB,, | Performed by: NURSE PRACTITIONER

## 2020-02-14 PROCEDURE — 99999 PR PBB SHADOW E&M-EST. PATIENT-LVL IV: ICD-10-PCS | Mod: PBBFAC,,, | Performed by: NURSE PRACTITIONER

## 2020-02-14 PROCEDURE — 3008F BODY MASS INDEX DOCD: CPT | Mod: CPTII,S$GLB,, | Performed by: NURSE PRACTITIONER

## 2020-02-14 PROCEDURE — 99999 PR PBB SHADOW E&M-EST. PATIENT-LVL IV: CPT | Mod: PBBFAC,,, | Performed by: NURSE PRACTITIONER

## 2020-02-14 NOTE — PROGRESS NOTES
"Subjective:       Patient ID: Elvia Alvarez is a 24 y.o. female.    Chief Complaint: hard time focusing (in school)    Patient who is new to me presents for trouble concentrating in school. She states she starts a task and does not finish. She did not pass last semester and she is working two jobs. She becomes frustruted when she is unable to compelte task. She will start one task and if someone stops her ask to start another task, she will completely forget about the previous one. She states she has a history of anxiety and an eating disorder. She has been placed on lexapro in the past but she stops after a month because of the mental side effects. She states her boss wants her to finish her masters and this puts pressure on her. She always helps others, "I will give someone my liver if they ask because I have a hard time telling people no." She denies any thoughts of SI/HI. She does have a sister who committed suicide and had difficulties with addiction. She does not want to be started on any lexapro today.     Review of Systems   Constitutional: Negative for chills, fatigue and fever.   HENT: Negative for congestion, sinus pressure, sinus pain, sneezing and sore throat.    Respiratory: Negative for cough, chest tightness, shortness of breath and wheezing.    Cardiovascular: Negative for chest pain, palpitations and leg swelling.   Gastrointestinal: Negative for abdominal distention, abdominal pain, constipation, diarrhea, nausea and vomiting.   Genitourinary: Negative for decreased urine volume, difficulty urinating, dysuria, frequency and urgency.   Musculoskeletal: Negative for arthralgias, gait problem, joint swelling and myalgias.   Skin: Negative for rash and wound.   Neurological: Negative for dizziness, light-headedness, numbness and headaches.   Psychiatric/Behavioral: Negative for behavioral problems, self-injury, sleep disturbance and suicidal ideas. The patient is nervous/anxious.        Objective: "      Physical Exam   Constitutional: She is oriented to person, place, and time. She appears well-developed and well-nourished.   HENT:   Head: Normocephalic and atraumatic.   Right Ear: External ear normal.   Left Ear: External ear normal.   Nose: Nose normal.   Mouth/Throat: Oropharynx is clear and moist.   Eyes: Pupils are equal, round, and reactive to light.   Neck: Normal range of motion.   Cardiovascular: Normal rate, regular rhythm, normal heart sounds and intact distal pulses.   Pulmonary/Chest: Effort normal and breath sounds normal.   Abdominal: Soft. Bowel sounds are normal.   Musculoskeletal: Normal range of motion.   Neurological: She is alert and oriented to person, place, and time.   Skin: Skin is warm and dry.   Nursing note and vitals reviewed.      Assessment:       1. Attention or concentration deficit    2. History of eating disorder    3. Anxiety and depression        Plan:       Elvia was seen today for hard time focusing.    Diagnoses and all orders for this visit:    Attention or concentration deficit  -     Ambulatory referral/consult to Neuropsychology; Future  -     CBC auto differential; Future  -     Comprehensive metabolic panel; Future  -     TSH; Future    History of eating disorder  -     Ambulatory referral/consult to Psychology; Future  -     CBC auto differential; Future  -     Comprehensive metabolic panel; Future  -     TSH; Future    Anxiety and depression  -     Ambulatory referral/consult to Neuropsychology; Future  -     Ambulatory referral/consult to Psychology; Future  -     CBC auto differential; Future  -     Comprehensive metabolic panel; Future  -     TSH; Future    Discussed with patient she will need to be evaluated by neuropsychology for possible ADD. Advised with history of depression and anxiety she should start therapy. Advised ER for any SI.   Discussed worsening signs/symptoms and when to return to clinic or go to ED.   Patient expresses understanding and  agrees with treatment plan.

## 2020-02-21 ENCOUNTER — LAB VISIT (OUTPATIENT)
Dept: LAB | Facility: HOSPITAL | Age: 25
End: 2020-02-21
Attending: NURSE PRACTITIONER
Payer: COMMERCIAL

## 2020-02-21 DIAGNOSIS — F41.9 ANXIETY AND DEPRESSION: ICD-10-CM

## 2020-02-21 DIAGNOSIS — F32.A ANXIETY AND DEPRESSION: ICD-10-CM

## 2020-02-21 DIAGNOSIS — Z86.59 HISTORY OF EATING DISORDER: ICD-10-CM

## 2020-02-21 DIAGNOSIS — R41.840 ATTENTION OR CONCENTRATION DEFICIT: ICD-10-CM

## 2020-02-21 LAB
ALBUMIN SERPL BCP-MCNC: 4.5 G/DL (ref 3.5–5.2)
ALP SERPL-CCNC: 60 U/L (ref 55–135)
ALT SERPL W/O P-5'-P-CCNC: 22 U/L (ref 10–44)
ANION GAP SERPL CALC-SCNC: 7 MMOL/L (ref 8–16)
AST SERPL-CCNC: 25 U/L (ref 10–40)
BASOPHILS # BLD AUTO: 0.04 K/UL (ref 0–0.2)
BASOPHILS NFR BLD: 0.6 % (ref 0–1.9)
BILIRUB SERPL-MCNC: 0.2 MG/DL (ref 0.1–1)
BUN SERPL-MCNC: 16 MG/DL (ref 6–20)
CALCIUM SERPL-MCNC: 9.5 MG/DL (ref 8.7–10.5)
CHLORIDE SERPL-SCNC: 105 MMOL/L (ref 95–110)
CO2 SERPL-SCNC: 27 MMOL/L (ref 23–29)
CREAT SERPL-MCNC: 0.8 MG/DL (ref 0.5–1.4)
DIFFERENTIAL METHOD: NORMAL
EOSINOPHIL # BLD AUTO: 0.2 K/UL (ref 0–0.5)
EOSINOPHIL NFR BLD: 3.4 % (ref 0–8)
ERYTHROCYTE [DISTWIDTH] IN BLOOD BY AUTOMATED COUNT: 12.8 % (ref 11.5–14.5)
EST. GFR  (AFRICAN AMERICAN): >60 ML/MIN/1.73 M^2
EST. GFR  (NON AFRICAN AMERICAN): >60 ML/MIN/1.73 M^2
GLUCOSE SERPL-MCNC: 74 MG/DL (ref 70–110)
HCT VFR BLD AUTO: 42.5 % (ref 37–48.5)
HGB BLD-MCNC: 13.6 G/DL (ref 12–16)
IMM GRANULOCYTES # BLD AUTO: 0.01 K/UL (ref 0–0.04)
IMM GRANULOCYTES NFR BLD AUTO: 0.2 % (ref 0–0.5)
LYMPHOCYTES # BLD AUTO: 2.1 K/UL (ref 1–4.8)
LYMPHOCYTES NFR BLD: 33 % (ref 18–48)
MCH RBC QN AUTO: 28.7 PG (ref 27–31)
MCHC RBC AUTO-ENTMCNC: 32 G/DL (ref 32–36)
MCV RBC AUTO: 90 FL (ref 82–98)
MONOCYTES # BLD AUTO: 0.5 K/UL (ref 0.3–1)
MONOCYTES NFR BLD: 8.5 % (ref 4–15)
NEUTROPHILS # BLD AUTO: 3.4 K/UL (ref 1.8–7.7)
NEUTROPHILS NFR BLD: 54.3 % (ref 38–73)
NRBC BLD-RTO: 0 /100 WBC
PLATELET # BLD AUTO: 250 K/UL (ref 150–350)
PMV BLD AUTO: 10.4 FL (ref 9.2–12.9)
POTASSIUM SERPL-SCNC: 4.3 MMOL/L (ref 3.5–5.1)
PROT SERPL-MCNC: 7.4 G/DL (ref 6–8.4)
RBC # BLD AUTO: 4.74 M/UL (ref 4–5.4)
SODIUM SERPL-SCNC: 139 MMOL/L (ref 136–145)
TSH SERPL DL<=0.005 MIU/L-ACNC: 1.7 UIU/ML (ref 0.4–4)
WBC # BLD AUTO: 6.22 K/UL (ref 3.9–12.7)

## 2020-02-21 PROCEDURE — 85025 COMPLETE CBC W/AUTO DIFF WBC: CPT

## 2020-02-21 PROCEDURE — 84443 ASSAY THYROID STIM HORMONE: CPT

## 2020-02-21 PROCEDURE — 80053 COMPREHEN METABOLIC PANEL: CPT

## 2020-02-21 PROCEDURE — 36415 COLL VENOUS BLD VENIPUNCTURE: CPT | Mod: PO

## 2020-03-10 ENCOUNTER — TELEPHONE (OUTPATIENT)
Dept: FAMILY MEDICINE | Facility: CLINIC | Age: 25
End: 2020-03-10

## 2020-03-10 NOTE — TELEPHONE ENCOUNTER
Attempted to contact pt. No answer. LVM stating that no report has been receive and she should try contacting them, if any other questions she can give us call back.

## 2020-03-10 NOTE — TELEPHONE ENCOUNTER
----- Message from Paola Chan sent at 3/9/2020  4:54 PM CDT -----  Contact: Patient  Patient called to check on status of report from psychologist that Ronda Gonsalez sent her to.  Patient is very overwhelmed with all of this and school and would like to know what is what.    Please call [place] at: [phone]

## 2020-03-20 RX ORDER — AZELASTINE 1 MG/ML
2 SPRAY, METERED NASAL 2 TIMES DAILY
Qty: 274 ML | Refills: 2 | Status: SHIPPED | OUTPATIENT
Start: 2020-03-20 | End: 2021-08-17

## 2020-03-20 RX ORDER — AZELASTINE 1 MG/ML
SPRAY, METERED NASAL
COMMUNITY
Start: 2020-03-19 | End: 2020-03-20 | Stop reason: SDUPTHER

## 2020-03-20 NOTE — TELEPHONE ENCOUNTER
----- Message from Bethany Bernal sent at 3/20/2020 10:35 AM CDT -----  Contact: pt  Type:  Patient Returning Call    Who Called:  pt  Does the patient know what this is regarding?: Azelastine solution was chewed by dog and pt would like for another to send to pharmacy  Best Call Back Number:    Additional Information:  Please call and advise. Thank you

## 2020-03-22 RX ORDER — AZELASTINE 1 MG/ML
2 SPRAY, METERED NASAL 2 TIMES DAILY
Qty: 274 ML | Refills: 2 | Status: CANCELLED | OUTPATIENT
Start: 2020-03-22

## 2020-03-22 NOTE — TELEPHONE ENCOUNTER
Notified patient that medication was approved on 3/20 and sent to [facility] in [place]

## 2020-03-31 ENCOUNTER — TELEPHONE (OUTPATIENT)
Dept: FAMILY MEDICINE | Facility: CLINIC | Age: 25
End: 2020-03-31

## 2020-03-31 NOTE — TELEPHONE ENCOUNTER
----- Message from Carlos White sent at 3/31/2020 11:05 AM CDT -----  Contact: pt  Type: Needs Medical Advice    Who Called:  Pt   Symptoms (please be specific):    How long has patient had these symptoms:    Pharmacy name and phone #:    Best Call Back Number:    Additional Information: wants someone to call her bc she is frustrated with the psychiatry Dr, he is not responding to her calls and she wants to know what to do.Also she has school next week really needs someone to call her back

## 2020-04-01 ENCOUNTER — OFFICE VISIT (OUTPATIENT)
Dept: FAMILY MEDICINE | Facility: CLINIC | Age: 25
End: 2020-04-01
Payer: COMMERCIAL

## 2020-04-01 DIAGNOSIS — F90.2 ATTENTION DEFICIT HYPERACTIVITY DISORDER (ADHD), COMBINED TYPE: Primary | ICD-10-CM

## 2020-04-01 PROCEDURE — 99213 OFFICE O/P EST LOW 20 MIN: CPT | Mod: 95,,, | Performed by: INTERNAL MEDICINE

## 2020-04-01 PROCEDURE — 99213 PR OFFICE/OUTPT VISIT, EST, LEVL III, 20-29 MIN: ICD-10-PCS | Mod: 95,,, | Performed by: INTERNAL MEDICINE

## 2020-04-01 RX ORDER — DEXTROAMPHETAMINE SACCHARATE, AMPHETAMINE ASPARTATE, DEXTROAMPHETAMINE SULFATE AND AMPHETAMINE SULFATE 1.25; 1.25; 1.25; 1.25 MG/1; MG/1; MG/1; MG/1
5 TABLET ORAL 2 TIMES DAILY PRN
Qty: 60 TABLET | Refills: 0 | Status: SHIPPED | OUTPATIENT
Start: 2020-04-01 | End: 2020-06-06 | Stop reason: SDUPTHER

## 2020-04-01 NOTE — PROGRESS NOTES
Subjective:       Patient ID: Elvia Alvarez is a 25 y.o. female.    Chief Complaint: No chief complaint on file.    The patient location is: house  The chief complaint leading to consultation is: adhd  Visit type: Virtual visit with synchronous audio and video  Total time spent with patient: 20 minutes  Each patient to whom he or she provides medical services by telemedicine is:  (1) informed of the relationship between the physician and patient and the respective role of any other health care provider with respect to management of the patient; and (2) notified that he or she may decline to receive medical services by telemedicine and may withdraw from such care at any time.    Notes: pt recently had adhd testing done by dr adames and was found to have a diagnosis of adhd. She is doing school work in the mornings for 2.5 hours then going to work then doing more school work when she gets home. She only feels she needs the medication for her school work. She has taken her brothers 5 mg adderall and it worked well.       Review of Systems   Constitutional: Negative for fatigue, fever and unexpected weight change.   HENT: Negative for congestion, postnasal drip, sinus pain and sore throat.    Eyes: Negative for visual disturbance.   Respiratory: Negative for cough, chest tightness, shortness of breath and wheezing.    Cardiovascular: Negative for chest pain, palpitations and leg swelling.   Gastrointestinal: Negative for abdominal pain, blood in stool, constipation, diarrhea, nausea and vomiting.   Endocrine: Negative for cold intolerance and heat intolerance.   Genitourinary: Negative for difficulty urinating, dysuria and frequency.   Musculoskeletal: Negative for back pain, joint swelling and myalgias.   Skin: Negative for rash.   Allergic/Immunologic: Negative for environmental allergies.   Neurological: Negative for dizziness, seizures, numbness and headaches.   Hematological: Does not bruise/bleed easily.    Psychiatric/Behavioral: Negative for agitation and sleep disturbance.       Objective:      Physical Exam   Constitutional: She is oriented to person, place, and time. She appears well-developed.   Neurological: She is alert and oriented to person, place, and time.   Psychiatric: She has a normal mood and affect.       Assessment:       1. Attention deficit hyperactivity disorder (ADHD), combined type        Plan:       adhd- start adderal 5 mg to take as needed. Will follow up next mo to make sure vitals good.  Update me next week to elt me know if effective

## 2020-04-13 ENCOUNTER — PATIENT MESSAGE (OUTPATIENT)
Dept: FAMILY MEDICINE | Facility: CLINIC | Age: 25
End: 2020-04-13

## 2020-04-13 RX ORDER — FLUTICASONE PROPIONATE 50 MCG
SPRAY, SUSPENSION (ML) NASAL
Qty: 16 ML | Refills: 0 | Status: SHIPPED | OUTPATIENT
Start: 2020-04-13 | End: 2021-08-17

## 2020-05-05 ENCOUNTER — TELEPHONE (OUTPATIENT)
Dept: NEUROLOGY | Facility: CLINIC | Age: 25
End: 2020-05-05

## 2020-09-22 ENCOUNTER — PATIENT MESSAGE (OUTPATIENT)
Dept: FAMILY MEDICINE | Facility: CLINIC | Age: 25
End: 2020-09-22

## 2020-09-22 RX ORDER — DEXTROAMPHETAMINE SACCHARATE, AMPHETAMINE ASPARTATE, DEXTROAMPHETAMINE SULFATE AND AMPHETAMINE SULFATE 1.25; 1.25; 1.25; 1.25 MG/1; MG/1; MG/1; MG/1
5 TABLET ORAL 2 TIMES DAILY PRN
Qty: 60 TABLET | Refills: 0 | Status: SHIPPED | OUTPATIENT
Start: 2020-09-22 | End: 2021-08-17 | Stop reason: SDUPTHER

## 2020-09-23 ENCOUNTER — OFFICE VISIT (OUTPATIENT)
Dept: FAMILY MEDICINE | Facility: CLINIC | Age: 25
End: 2020-09-23
Payer: COMMERCIAL

## 2020-09-23 ENCOUNTER — PATIENT MESSAGE (OUTPATIENT)
Dept: FAMILY MEDICINE | Facility: CLINIC | Age: 25
End: 2020-09-23

## 2020-09-23 DIAGNOSIS — F90.9 ATTENTION DEFICIT HYPERACTIVITY DISORDER (ADHD), UNSPECIFIED ADHD TYPE: Primary | ICD-10-CM

## 2020-09-23 PROCEDURE — 99213 OFFICE O/P EST LOW 20 MIN: CPT | Mod: 95,,, | Performed by: INTERNAL MEDICINE

## 2020-09-23 PROCEDURE — 99213 PR OFFICE/OUTPT VISIT, EST, LEVL III, 20-29 MIN: ICD-10-PCS | Mod: 95,,, | Performed by: INTERNAL MEDICINE

## 2020-09-23 RX ORDER — DEXTROAMPHETAMINE SACCHARATE, AMPHETAMINE ASPARTATE MONOHYDRATE, DEXTROAMPHETAMINE SULFATE AND AMPHETAMINE SULFATE 2.5; 2.5; 2.5; 2.5 MG/1; MG/1; MG/1; MG/1
10 CAPSULE, EXTENDED RELEASE ORAL EVERY MORNING
Qty: 30 CAPSULE | Refills: 0 | Status: SHIPPED | OUTPATIENT
Start: 2020-09-23 | End: 2021-08-17 | Stop reason: SDUPTHER

## 2020-09-23 NOTE — PROGRESS NOTES
Subjective:       Patient ID: Elvia Alvarez is a 25 y.o. female.    Chief Complaint: No chief complaint on file.    The patient location is: work  The chief complaint leading to consultation is: adhd    Visit type: audiovisual    Face to Face time with patient: 20 minutes of total time spent on the encounter, which includes face to face time and non-face to face time preparing to see the patient (eg, review of tests), Obtaining and/or reviewing separately obtained history, Documenting clinical information in the electronic or other health record, Independently interpreting results (not separately reported) and communicating results to the patient/family/caregiver, or Care coordination (not separately reported).         Each patient to whom he or she provides medical services by telemedicine is:  (1) informed of the relationship between the physician and patient and the respective role of any other health care provider with respect to management of the patient; and (2) notified that he or she may decline to receive medical services by telemedicine and may withdraw from such care at any time.    Notes:       Pt calling in today for refill of addearll. She would like to try a time released low dose as the immediate dose keeps her up if takes it too late for studying.  She has no side effects. She is eating well. No cp or palpitations.     Review of Systems   Constitutional: Negative for activity change and unexpected weight change.   HENT: Negative for hearing loss, rhinorrhea and trouble swallowing.    Eyes: Negative for discharge and visual disturbance.   Respiratory: Negative for chest tightness and wheezing.    Cardiovascular: Negative for chest pain and palpitations.   Gastrointestinal: Negative for blood in stool, constipation, diarrhea and vomiting.   Endocrine: Negative for polydipsia and polyuria.   Genitourinary: Negative for difficulty urinating, dysuria, hematuria and menstrual problem.    Musculoskeletal: Negative for arthralgias, joint swelling and neck pain.   Neurological: Negative for weakness and headaches.   Psychiatric/Behavioral: Negative for confusion and dysphoric mood.         Objective:      Physical Exam  Constitutional:       Appearance: Normal appearance.   HENT:      Head: Normocephalic and atraumatic.   Pulmonary:      Effort: Pulmonary effort is normal.         Assessment:       1. Attention deficit hyperactivity disorder (ADHD), unspecified ADHD type        Plan:       adhd- sent in addearll xr 10 mg for her to try. Pt to make appt within next few months for in person visit

## 2021-08-17 ENCOUNTER — OFFICE VISIT (OUTPATIENT)
Dept: FAMILY MEDICINE | Facility: CLINIC | Age: 26
End: 2021-08-17
Payer: COMMERCIAL

## 2021-08-17 VITALS
HEIGHT: 66 IN | OXYGEN SATURATION: 98 % | DIASTOLIC BLOOD PRESSURE: 82 MMHG | WEIGHT: 143.75 LBS | HEART RATE: 99 BPM | SYSTOLIC BLOOD PRESSURE: 116 MMHG | BODY MASS INDEX: 23.1 KG/M2

## 2021-08-17 DIAGNOSIS — F32.A DEPRESSION, UNSPECIFIED DEPRESSION TYPE: Primary | ICD-10-CM

## 2021-08-17 DIAGNOSIS — F90.9 ATTENTION DEFICIT HYPERACTIVITY DISORDER (ADHD), UNSPECIFIED ADHD TYPE: ICD-10-CM

## 2021-08-17 PROCEDURE — 99999 PR PBB SHADOW E&M-EST. PATIENT-LVL III: CPT | Mod: PBBFAC,,, | Performed by: INTERNAL MEDICINE

## 2021-08-17 PROCEDURE — 3074F SYST BP LT 130 MM HG: CPT | Mod: CPTII,S$GLB,, | Performed by: INTERNAL MEDICINE

## 2021-08-17 PROCEDURE — 3079F PR MOST RECENT DIASTOLIC BLOOD PRESSURE 80-89 MM HG: ICD-10-PCS | Mod: CPTII,S$GLB,, | Performed by: INTERNAL MEDICINE

## 2021-08-17 PROCEDURE — 1159F MED LIST DOCD IN RCRD: CPT | Mod: CPTII,S$GLB,, | Performed by: INTERNAL MEDICINE

## 2021-08-17 PROCEDURE — 3074F PR MOST RECENT SYSTOLIC BLOOD PRESSURE < 130 MM HG: ICD-10-PCS | Mod: CPTII,S$GLB,, | Performed by: INTERNAL MEDICINE

## 2021-08-17 PROCEDURE — 99214 PR OFFICE/OUTPT VISIT, EST, LEVL IV, 30-39 MIN: ICD-10-PCS | Mod: S$GLB,,, | Performed by: INTERNAL MEDICINE

## 2021-08-17 PROCEDURE — 3008F BODY MASS INDEX DOCD: CPT | Mod: CPTII,S$GLB,, | Performed by: INTERNAL MEDICINE

## 2021-08-17 PROCEDURE — 1126F PR PAIN SEVERITY QUANTIFIED, NO PAIN PRESENT: ICD-10-PCS | Mod: CPTII,S$GLB,, | Performed by: INTERNAL MEDICINE

## 2021-08-17 PROCEDURE — 3079F DIAST BP 80-89 MM HG: CPT | Mod: CPTII,S$GLB,, | Performed by: INTERNAL MEDICINE

## 2021-08-17 PROCEDURE — 99214 OFFICE O/P EST MOD 30 MIN: CPT | Mod: S$GLB,,, | Performed by: INTERNAL MEDICINE

## 2021-08-17 PROCEDURE — 3008F PR BODY MASS INDEX (BMI) DOCUMENTED: ICD-10-PCS | Mod: CPTII,S$GLB,, | Performed by: INTERNAL MEDICINE

## 2021-08-17 PROCEDURE — 1159F PR MEDICATION LIST DOCUMENTED IN MEDICAL RECORD: ICD-10-PCS | Mod: CPTII,S$GLB,, | Performed by: INTERNAL MEDICINE

## 2021-08-17 PROCEDURE — 99999 PR PBB SHADOW E&M-EST. PATIENT-LVL III: ICD-10-PCS | Mod: PBBFAC,,, | Performed by: INTERNAL MEDICINE

## 2021-08-17 PROCEDURE — 1126F AMNT PAIN NOTED NONE PRSNT: CPT | Mod: CPTII,S$GLB,, | Performed by: INTERNAL MEDICINE

## 2021-08-17 RX ORDER — DEXTROAMPHETAMINE SACCHARATE, AMPHETAMINE ASPARTATE MONOHYDRATE, DEXTROAMPHETAMINE SULFATE AND AMPHETAMINE SULFATE 2.5; 2.5; 2.5; 2.5 MG/1; MG/1; MG/1; MG/1
10 CAPSULE, EXTENDED RELEASE ORAL EVERY MORNING
Qty: 30 CAPSULE | Refills: 0 | Status: SHIPPED | OUTPATIENT
Start: 2021-08-17 | End: 2021-10-26 | Stop reason: SDUPTHER

## 2021-08-17 RX ORDER — DEXTROAMPHETAMINE SACCHARATE, AMPHETAMINE ASPARTATE, DEXTROAMPHETAMINE SULFATE AND AMPHETAMINE SULFATE 1.25; 1.25; 1.25; 1.25 MG/1; MG/1; MG/1; MG/1
5 TABLET ORAL 2 TIMES DAILY PRN
Qty: 60 TABLET | Refills: 0 | Status: SHIPPED | OUTPATIENT
Start: 2021-08-17 | End: 2021-10-26 | Stop reason: SDUPTHER

## 2021-08-17 RX ORDER — SERTRALINE HYDROCHLORIDE 50 MG/1
50 TABLET, FILM COATED ORAL DAILY
Qty: 30 TABLET | Refills: 11 | Status: SHIPPED | OUTPATIENT
Start: 2021-08-17 | End: 2021-09-14 | Stop reason: SDUPTHER

## 2021-09-15 RX ORDER — SERTRALINE HYDROCHLORIDE 50 MG/1
50 TABLET, FILM COATED ORAL DAILY
Qty: 30 TABLET | Refills: 11 | Status: SHIPPED | OUTPATIENT
Start: 2021-09-15 | End: 2021-10-26

## 2021-09-26 ENCOUNTER — PATIENT MESSAGE (OUTPATIENT)
Dept: FAMILY MEDICINE | Facility: CLINIC | Age: 26
End: 2021-09-26

## 2021-10-25 ENCOUNTER — PATIENT MESSAGE (OUTPATIENT)
Dept: FAMILY MEDICINE | Facility: CLINIC | Age: 26
End: 2021-10-25
Payer: COMMERCIAL

## 2021-10-26 ENCOUNTER — PATIENT MESSAGE (OUTPATIENT)
Dept: FAMILY MEDICINE | Facility: CLINIC | Age: 26
End: 2021-10-26
Payer: COMMERCIAL

## 2021-10-26 RX ORDER — DEXTROAMPHETAMINE SACCHARATE, AMPHETAMINE ASPARTATE MONOHYDRATE, DEXTROAMPHETAMINE SULFATE AND AMPHETAMINE SULFATE 2.5; 2.5; 2.5; 2.5 MG/1; MG/1; MG/1; MG/1
10 CAPSULE, EXTENDED RELEASE ORAL EVERY MORNING
Qty: 30 CAPSULE | Refills: 0 | Status: SHIPPED | OUTPATIENT
Start: 2021-10-26 | End: 2022-01-04 | Stop reason: SDUPTHER

## 2021-10-26 RX ORDER — DEXTROAMPHETAMINE SACCHARATE, AMPHETAMINE ASPARTATE, DEXTROAMPHETAMINE SULFATE AND AMPHETAMINE SULFATE 1.25; 1.25; 1.25; 1.25 MG/1; MG/1; MG/1; MG/1
5 TABLET ORAL 2 TIMES DAILY PRN
Qty: 60 TABLET | Refills: 0 | Status: SHIPPED | OUTPATIENT
Start: 2021-10-26 | End: 2022-01-04 | Stop reason: SDUPTHER

## 2021-10-26 RX ORDER — SERTRALINE HYDROCHLORIDE 100 MG/1
100 TABLET, FILM COATED ORAL DAILY
Qty: 30 TABLET | Refills: 11 | Status: SHIPPED | OUTPATIENT
Start: 2021-10-26 | End: 2021-12-14

## 2021-11-20 ENCOUNTER — PATIENT MESSAGE (OUTPATIENT)
Dept: FAMILY MEDICINE | Facility: CLINIC | Age: 26
End: 2021-11-20
Payer: COMMERCIAL

## 2021-11-22 ENCOUNTER — PATIENT MESSAGE (OUTPATIENT)
Dept: FAMILY MEDICINE | Facility: CLINIC | Age: 26
End: 2021-11-22
Payer: COMMERCIAL

## 2021-11-30 ENCOUNTER — OFFICE VISIT (OUTPATIENT)
Dept: FAMILY MEDICINE | Facility: CLINIC | Age: 26
End: 2021-11-30
Payer: COMMERCIAL

## 2021-11-30 ENCOUNTER — TELEPHONE (OUTPATIENT)
Dept: FAMILY MEDICINE | Facility: CLINIC | Age: 26
End: 2021-11-30

## 2021-11-30 DIAGNOSIS — F32.A DEPRESSION, UNSPECIFIED DEPRESSION TYPE: Primary | ICD-10-CM

## 2021-11-30 PROCEDURE — 99213 OFFICE O/P EST LOW 20 MIN: CPT | Mod: 95,,, | Performed by: INTERNAL MEDICINE

## 2021-11-30 PROCEDURE — 99213 PR OFFICE/OUTPT VISIT, EST, LEVL III, 20-29 MIN: ICD-10-PCS | Mod: 95,,, | Performed by: INTERNAL MEDICINE

## 2021-11-30 RX ORDER — BUPROPION HYDROCHLORIDE 300 MG/1
300 TABLET ORAL DAILY
Qty: 30 TABLET | Refills: 11 | Status: SHIPPED | OUTPATIENT
Start: 2021-11-30 | End: 2022-01-04 | Stop reason: SDUPTHER

## 2021-12-01 ENCOUNTER — OFFICE VISIT (OUTPATIENT)
Dept: URGENT CARE | Facility: CLINIC | Age: 26
End: 2021-12-01
Payer: COMMERCIAL

## 2021-12-01 VITALS
BODY MASS INDEX: 22.98 KG/M2 | WEIGHT: 143 LBS | RESPIRATION RATE: 16 BRPM | HEIGHT: 66 IN | OXYGEN SATURATION: 98 % | SYSTOLIC BLOOD PRESSURE: 112 MMHG | TEMPERATURE: 98 F | DIASTOLIC BLOOD PRESSURE: 82 MMHG | HEART RATE: 110 BPM

## 2021-12-01 DIAGNOSIS — J06.9 UPPER RESPIRATORY TRACT INFECTION, UNSPECIFIED TYPE: Primary | ICD-10-CM

## 2021-12-01 PROCEDURE — 99214 PR OFFICE/OUTPT VISIT, EST, LEVL IV, 30-39 MIN: ICD-10-PCS | Mod: S$GLB,,, | Performed by: FAMILY MEDICINE

## 2021-12-01 PROCEDURE — 99214 OFFICE O/P EST MOD 30 MIN: CPT | Mod: S$GLB,,, | Performed by: FAMILY MEDICINE

## 2021-12-01 RX ORDER — PROMETHAZINE HYDROCHLORIDE 6.25 MG/5ML
SYRUP ORAL
Qty: 120 ML | Refills: 1 | Status: SHIPPED | OUTPATIENT
Start: 2021-12-01

## 2021-12-01 RX ORDER — METHYLPREDNISOLONE 4 MG/1
TABLET ORAL
Qty: 1 EACH | Refills: 0 | Status: SHIPPED | OUTPATIENT
Start: 2021-12-01

## 2021-12-14 ENCOUNTER — OFFICE VISIT (OUTPATIENT)
Dept: FAMILY MEDICINE | Facility: CLINIC | Age: 26
End: 2021-12-14
Payer: COMMERCIAL

## 2021-12-14 VITALS
HEART RATE: 100 BPM | SYSTOLIC BLOOD PRESSURE: 104 MMHG | DIASTOLIC BLOOD PRESSURE: 60 MMHG | RESPIRATION RATE: 18 BRPM | WEIGHT: 123.38 LBS | BODY MASS INDEX: 19.91 KG/M2

## 2021-12-14 DIAGNOSIS — F41.1 GAD (GENERALIZED ANXIETY DISORDER): Primary | ICD-10-CM

## 2021-12-14 DIAGNOSIS — F32.A DEPRESSION, UNSPECIFIED DEPRESSION TYPE: ICD-10-CM

## 2021-12-14 PROCEDURE — 99213 OFFICE O/P EST LOW 20 MIN: CPT | Mod: S$GLB,,, | Performed by: INTERNAL MEDICINE

## 2021-12-14 PROCEDURE — 99213 PR OFFICE/OUTPT VISIT, EST, LEVL III, 20-29 MIN: ICD-10-PCS | Mod: S$GLB,,, | Performed by: INTERNAL MEDICINE

## 2021-12-14 PROCEDURE — 99999 PR PBB SHADOW E&M-EST. PATIENT-LVL III: CPT | Mod: PBBFAC,,, | Performed by: INTERNAL MEDICINE

## 2021-12-14 PROCEDURE — 99999 PR PBB SHADOW E&M-EST. PATIENT-LVL III: ICD-10-PCS | Mod: PBBFAC,,, | Performed by: INTERNAL MEDICINE

## 2021-12-14 RX ORDER — FLUOXETINE 10 MG/1
10 CAPSULE ORAL DAILY
Qty: 30 CAPSULE | Refills: 11 | Status: SHIPPED | OUTPATIENT
Start: 2021-12-14 | End: 2022-03-30 | Stop reason: SDUPTHER

## 2022-01-28 ENCOUNTER — TELEPHONE (OUTPATIENT)
Dept: PSYCHIATRY | Facility: CLINIC | Age: 27
End: 2022-01-28
Payer: COMMERCIAL

## 2022-01-28 ENCOUNTER — PATIENT MESSAGE (OUTPATIENT)
Dept: FAMILY MEDICINE | Facility: CLINIC | Age: 27
End: 2022-01-28
Payer: COMMERCIAL

## 2022-01-28 NOTE — TELEPHONE ENCOUNTER
Left multiple vms regarding rescheduling pt to Dr. LIANG or reschedule pt with Dr. MCLEOD in mid march when she gets back.

## 2022-02-01 ENCOUNTER — TELEPHONE (OUTPATIENT)
Dept: PSYCHIATRY | Facility: CLINIC | Age: 27
End: 2022-02-01
Payer: COMMERCIAL

## 2022-02-02 ENCOUNTER — PATIENT MESSAGE (OUTPATIENT)
Dept: PSYCHIATRY | Facility: CLINIC | Age: 27
End: 2022-02-02
Payer: COMMERCIAL

## 2022-04-06 ENCOUNTER — OFFICE VISIT (OUTPATIENT)
Dept: FAMILY MEDICINE | Facility: CLINIC | Age: 27
End: 2022-04-06
Payer: COMMERCIAL

## 2022-04-06 VITALS
SYSTOLIC BLOOD PRESSURE: 90 MMHG | WEIGHT: 123.88 LBS | DIASTOLIC BLOOD PRESSURE: 64 MMHG | OXYGEN SATURATION: 97 % | BODY MASS INDEX: 19.91 KG/M2 | HEART RATE: 81 BPM | HEIGHT: 66 IN

## 2022-04-06 DIAGNOSIS — F90.9 ATTENTION DEFICIT HYPERACTIVITY DISORDER (ADHD), UNSPECIFIED ADHD TYPE: Primary | ICD-10-CM

## 2022-04-06 DIAGNOSIS — F41.1 GAD (GENERALIZED ANXIETY DISORDER): ICD-10-CM

## 2022-04-06 PROCEDURE — 3008F BODY MASS INDEX DOCD: CPT | Mod: CPTII,S$GLB,, | Performed by: INTERNAL MEDICINE

## 2022-04-06 PROCEDURE — 99213 PR OFFICE/OUTPT VISIT, EST, LEVL III, 20-29 MIN: ICD-10-PCS | Mod: S$GLB,,, | Performed by: INTERNAL MEDICINE

## 2022-04-06 PROCEDURE — 99213 OFFICE O/P EST LOW 20 MIN: CPT | Mod: S$GLB,,, | Performed by: INTERNAL MEDICINE

## 2022-04-06 PROCEDURE — 3078F PR MOST RECENT DIASTOLIC BLOOD PRESSURE < 80 MM HG: ICD-10-PCS | Mod: CPTII,S$GLB,, | Performed by: INTERNAL MEDICINE

## 2022-04-06 PROCEDURE — 99999 PR PBB SHADOW E&M-EST. PATIENT-LVL III: ICD-10-PCS | Mod: PBBFAC,,, | Performed by: INTERNAL MEDICINE

## 2022-04-06 PROCEDURE — 3074F SYST BP LT 130 MM HG: CPT | Mod: CPTII,S$GLB,, | Performed by: INTERNAL MEDICINE

## 2022-04-06 PROCEDURE — 3008F PR BODY MASS INDEX (BMI) DOCUMENTED: ICD-10-PCS | Mod: CPTII,S$GLB,, | Performed by: INTERNAL MEDICINE

## 2022-04-06 PROCEDURE — 3078F DIAST BP <80 MM HG: CPT | Mod: CPTII,S$GLB,, | Performed by: INTERNAL MEDICINE

## 2022-04-06 PROCEDURE — 3074F PR MOST RECENT SYSTOLIC BLOOD PRESSURE < 130 MM HG: ICD-10-PCS | Mod: CPTII,S$GLB,, | Performed by: INTERNAL MEDICINE

## 2022-04-06 PROCEDURE — 99999 PR PBB SHADOW E&M-EST. PATIENT-LVL III: CPT | Mod: PBBFAC,,, | Performed by: INTERNAL MEDICINE

## 2022-04-06 RX ORDER — DEXTROAMPHETAMINE SACCHARATE, AMPHETAMINE ASPARTATE, DEXTROAMPHETAMINE SULFATE AND AMPHETAMINE SULFATE 1.25; 1.25; 1.25; 1.25 MG/1; MG/1; MG/1; MG/1
5 TABLET ORAL 2 TIMES DAILY PRN
Qty: 60 TABLET | Refills: 0 | Status: SHIPPED | OUTPATIENT
Start: 2022-04-06 | End: 2022-08-02 | Stop reason: SDUPTHER

## 2022-04-06 RX ORDER — DEXTROAMPHETAMINE SACCHARATE, AMPHETAMINE ASPARTATE MONOHYDRATE, DEXTROAMPHETAMINE SULFATE AND AMPHETAMINE SULFATE 2.5; 2.5; 2.5; 2.5 MG/1; MG/1; MG/1; MG/1
10 CAPSULE, EXTENDED RELEASE ORAL EVERY MORNING
Qty: 30 CAPSULE | Refills: 0 | Status: SHIPPED | OUTPATIENT
Start: 2022-04-06 | End: 2022-08-02 | Stop reason: SDUPTHER

## 2022-04-06 RX ORDER — FLUOXETINE 10 MG/1
10 CAPSULE ORAL DAILY
Qty: 30 CAPSULE | Refills: 11 | Status: SHIPPED | OUTPATIENT
Start: 2022-04-06 | End: 2022-10-22 | Stop reason: SDUPTHER

## 2022-04-06 RX ORDER — BUPROPION HYDROCHLORIDE 300 MG/1
300 TABLET ORAL DAILY
Qty: 30 TABLET | Refills: 11 | Status: SHIPPED | OUTPATIENT
Start: 2022-04-06 | End: 2022-10-22 | Stop reason: SDUPTHER

## 2022-04-06 NOTE — PROGRESS NOTES
"Subjective:       Patient ID: Elvia Alvarez is a 27 y.o. female.    Chief Complaint: Medication Refill    Pt here for check up and med refill    adhd- stable on adderal. No side effects,d oing well  Anxiety/depression- stable on prozac and wellbutrin, feels "solid"    Review of Systems   Constitutional: Negative for activity change and unexpected weight change.   HENT: Negative for hearing loss, rhinorrhea and trouble swallowing.    Eyes: Negative for discharge and visual disturbance.   Respiratory: Negative for chest tightness and wheezing.    Cardiovascular: Negative for chest pain and palpitations.   Gastrointestinal: Negative for blood in stool, constipation, diarrhea and vomiting.   Endocrine: Negative for polydipsia and polyuria.   Genitourinary: Negative for difficulty urinating, dysuria, hematuria and menstrual problem.   Musculoskeletal: Negative for arthralgias, joint swelling and neck pain.   Neurological: Negative for weakness and headaches.   Psychiatric/Behavioral: Negative for confusion and dysphoric mood.         Objective:      Physical Exam  Constitutional:       Appearance: Normal appearance.   HENT:      Head: Normocephalic.   Cardiovascular:      Rate and Rhythm: Normal rate and regular rhythm.   Pulmonary:      Effort: Pulmonary effort is normal.      Breath sounds: Normal breath sounds.   Musculoskeletal:         General: Normal range of motion.      Cervical back: Normal range of motion.   Neurological:      General: No focal deficit present.      Mental Status: She is alert.   Psychiatric:         Mood and Affect: Mood normal.         Behavior: Behavior normal.         Assessment:       Problem List Items Addressed This Visit        Psychiatric    OBDULIA (generalized anxiety disorder)      Other Visit Diagnoses     Attention deficit hyperactivity disorder (ADHD), unspecified ADHD type    -  Primary          Plan:       adhd- satble continue meds, refilled " adderall  Anxiety/depression-stable, refilled meds

## 2022-11-23 ENCOUNTER — PATIENT OUTREACH (OUTPATIENT)
Dept: ADMINISTRATIVE | Facility: HOSPITAL | Age: 27
End: 2022-11-23
Payer: COMMERCIAL

## 2022-11-23 ENCOUNTER — PATIENT MESSAGE (OUTPATIENT)
Dept: ADMINISTRATIVE | Facility: HOSPITAL | Age: 27
End: 2022-11-23
Payer: COMMERCIAL

## 2022-12-13 ENCOUNTER — PATIENT OUTREACH (OUTPATIENT)
Dept: ADMINISTRATIVE | Facility: HOSPITAL | Age: 27
End: 2022-12-13
Payer: COMMERCIAL

## 2022-12-13 ENCOUNTER — PATIENT MESSAGE (OUTPATIENT)
Dept: ADMINISTRATIVE | Facility: HOSPITAL | Age: 27
End: 2022-12-13
Payer: COMMERCIAL

## 2023-02-23 ENCOUNTER — OFFICE VISIT (OUTPATIENT)
Dept: FAMILY MEDICINE | Facility: CLINIC | Age: 28
End: 2023-02-23
Payer: COMMERCIAL

## 2023-02-23 ENCOUNTER — HOSPITAL ENCOUNTER (OUTPATIENT)
Dept: RADIOLOGY | Facility: HOSPITAL | Age: 28
Discharge: HOME | End: 2023-02-23
Attending: INTERNAL MEDICINE
Payer: COMMERCIAL

## 2023-02-23 ENCOUNTER — PATIENT MESSAGE (OUTPATIENT)
Dept: FAMILY MEDICINE | Facility: CLINIC | Age: 28
End: 2023-02-23

## 2023-02-23 VITALS
BODY MASS INDEX: 19.27 KG/M2 | HEIGHT: 66 IN | SYSTOLIC BLOOD PRESSURE: 100 MMHG | OXYGEN SATURATION: 96 % | WEIGHT: 119.94 LBS | HEART RATE: 83 BPM | DIASTOLIC BLOOD PRESSURE: 60 MMHG

## 2023-02-23 DIAGNOSIS — M54.9 BACK PAIN, UNSPECIFIED BACK LOCATION, UNSPECIFIED BACK PAIN LATERALITY, UNSPECIFIED CHRONICITY: Primary | ICD-10-CM

## 2023-02-23 DIAGNOSIS — Z00.00 ANNUAL PHYSICAL EXAM: ICD-10-CM

## 2023-02-23 DIAGNOSIS — M54.9 BACK PAIN, UNSPECIFIED BACK LOCATION, UNSPECIFIED BACK PAIN LATERALITY, UNSPECIFIED CHRONICITY: ICD-10-CM

## 2023-02-23 PROCEDURE — 99999 PR PBB SHADOW E&M-EST. PATIENT-LVL III: ICD-10-PCS | Mod: PBBFAC,,, | Performed by: INTERNAL MEDICINE

## 2023-02-23 PROCEDURE — 3074F PR MOST RECENT SYSTOLIC BLOOD PRESSURE < 130 MM HG: ICD-10-PCS | Mod: CPTII,S$GLB,, | Performed by: INTERNAL MEDICINE

## 2023-02-23 PROCEDURE — 72100 X-RAY EXAM L-S SPINE 2/3 VWS: CPT | Mod: 26,,, | Performed by: RADIOLOGY

## 2023-02-23 PROCEDURE — 3078F PR MOST RECENT DIASTOLIC BLOOD PRESSURE < 80 MM HG: ICD-10-PCS | Mod: CPTII,S$GLB,, | Performed by: INTERNAL MEDICINE

## 2023-02-23 PROCEDURE — 3008F BODY MASS INDEX DOCD: CPT | Mod: CPTII,S$GLB,, | Performed by: INTERNAL MEDICINE

## 2023-02-23 PROCEDURE — 99213 PR OFFICE/OUTPT VISIT, EST, LEVL III, 20-29 MIN: ICD-10-PCS | Mod: S$GLB,,, | Performed by: INTERNAL MEDICINE

## 2023-02-23 PROCEDURE — 99213 OFFICE O/P EST LOW 20 MIN: CPT | Mod: S$GLB,,, | Performed by: INTERNAL MEDICINE

## 2023-02-23 PROCEDURE — 1159F PR MEDICATION LIST DOCUMENTED IN MEDICAL RECORD: ICD-10-PCS | Mod: CPTII,S$GLB,, | Performed by: INTERNAL MEDICINE

## 2023-02-23 PROCEDURE — 3078F DIAST BP <80 MM HG: CPT | Mod: CPTII,S$GLB,, | Performed by: INTERNAL MEDICINE

## 2023-02-23 PROCEDURE — 72100 X-RAY EXAM L-S SPINE 2/3 VWS: CPT | Mod: TC,FY,PO

## 2023-02-23 PROCEDURE — 3008F PR BODY MASS INDEX (BMI) DOCUMENTED: ICD-10-PCS | Mod: CPTII,S$GLB,, | Performed by: INTERNAL MEDICINE

## 2023-02-23 PROCEDURE — 99999 PR PBB SHADOW E&M-EST. PATIENT-LVL III: CPT | Mod: PBBFAC,,, | Performed by: INTERNAL MEDICINE

## 2023-02-23 PROCEDURE — 72100 XR LUMBAR SPINE AP AND LATERAL: ICD-10-PCS | Mod: 26,,, | Performed by: RADIOLOGY

## 2023-02-23 PROCEDURE — 3074F SYST BP LT 130 MM HG: CPT | Mod: CPTII,S$GLB,, | Performed by: INTERNAL MEDICINE

## 2023-02-23 PROCEDURE — 1159F MED LIST DOCD IN RCRD: CPT | Mod: CPTII,S$GLB,, | Performed by: INTERNAL MEDICINE

## 2023-02-23 RX ORDER — DEXTROAMPHETAMINE SACCHARATE, AMPHETAMINE ASPARTATE MONOHYDRATE, DEXTROAMPHETAMINE SULFATE AND AMPHETAMINE SULFATE 5; 5; 5; 5 MG/1; MG/1; MG/1; MG/1
20 CAPSULE, EXTENDED RELEASE ORAL EVERY MORNING
Qty: 30 CAPSULE | Refills: 0 | Status: SHIPPED | OUTPATIENT
Start: 2023-02-23 | End: 2023-03-23 | Stop reason: SDUPTHER

## 2023-02-23 NOTE — PROGRESS NOTES
Subjective:       Patient ID: Elvia Alvarez is a 27 y.o. female.    Chief Complaint: Back Pain (Pt states she has always had back pain but it is getting worse)    Pt with worsening lower back pain.  She has numbness in toes. Also feet and legs will get numb.      Back Pain  Pertinent negatives include no chest pain, fever or headaches.   Review of Systems   Constitutional:  Negative for fever.   Respiratory:  Negative for shortness of breath.    Cardiovascular:  Negative for chest pain.   Musculoskeletal:  Positive for back pain.   Neurological:  Negative for headaches.       Objective:      Physical Exam  Constitutional:       Appearance: Normal appearance.   HENT:      Head: Normocephalic.   Cardiovascular:      Rate and Rhythm: Normal rate and regular rhythm.   Pulmonary:      Effort: Pulmonary effort is normal.      Breath sounds: Normal breath sounds.   Musculoskeletal:         General: Normal range of motion.      Cervical back: Normal range of motion.   Neurological:      General: No focal deficit present.      Mental Status: She is alert.   Psychiatric:         Mood and Affect: Mood normal.         Behavior: Behavior normal.       Assessment:       Problem List Items Addressed This Visit    None  Visit Diagnoses       Back pain, unspecified back location, unspecified back pain laterality, unspecified chronicity    -  Primary    Relevant Orders    X-Ray Lumbar Spine AP And Lateral (Completed)    Annual physical exam        Relevant Orders    CBC Auto Differential    Lipid Panel    Comprehensive Metabolic Panel    TSH            Plan:       Back pain - xray today. Will refer to pt/back and spine based on readings

## 2023-03-13 ENCOUNTER — CLINICAL SUPPORT (OUTPATIENT)
Dept: REHABILITATION | Facility: HOSPITAL | Age: 28
End: 2023-03-13
Attending: INTERNAL MEDICINE
Payer: COMMERCIAL

## 2023-03-13 DIAGNOSIS — G89.29 CHRONIC BILATERAL LOW BACK PAIN WITHOUT SCIATICA: ICD-10-CM

## 2023-03-13 DIAGNOSIS — M54.50 CHRONIC BILATERAL LOW BACK PAIN WITHOUT SCIATICA: ICD-10-CM

## 2023-03-13 DIAGNOSIS — M54.9 BACK PAIN, UNSPECIFIED BACK LOCATION, UNSPECIFIED BACK PAIN LATERALITY, UNSPECIFIED CHRONICITY: ICD-10-CM

## 2023-03-13 PROCEDURE — 97161 PT EVAL LOW COMPLEX 20 MIN: CPT | Mod: PO | Performed by: PHYSICAL THERAPIST

## 2023-03-13 NOTE — PLAN OF CARE
[facility]  Physical Therapy Initial Evaluation    Name: Elvia Alvarez  Clinic Number: [id number]    Therapy Diagnosis:   Encounter Diagnosis   Name Primary?    Back pain, unspecified back location, unspecified back pain laterality, unspecified chronicity      Physician: Maggy Stafford MD    Physician Orders: PT Eval and Treat   Medical Diagnosis from Referral:   Back pain, unspecified back location, unspecified back pain laterality, unspecified chronicity   Evaluation Date: 3/13/2023  Authorization Period Expiration: 02/27/2024  Plan of Care Expiration: 05/15/2023  Visit # / Visits authorized: 1    Time In: 1600  Time Out: 1700  Total Billable Time: 60 minutes    Precautions: Standard    Subjective   Date of onset: 2/23/2023  History of current condition - Elvia reports: having low back pain one week ago possibly due to 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 Patient recalls having a long history of low back pain on/off as well involving her posture. No radicular pain. Patient reported having numbness of the toes L >R while in seated position, intermittent noted. Low back pain is localized, intermittent as well. Insidious onset.       Past Medical History:   Diagnosis Date    Anxiety      Elvia Alvarez  has a past surgical history that includes Tonsillectomy and gave birth (06/02/2021).    Elvia has a current medication list which includes the following prescription(s): bupropion, dextroamphetamine-amphetamine, dextroamphetamine-amphetamine, fluoxetine, methylprednisolone, and promethazine.    Review of patient's allergies indicates:  No Known Allergies     Imaging, x-ray: 2/23/2023  Bone density is normal. There is a very gentle broad levocurvature of the lumbar spine, some of which could be positional. The vertebral bodies maintain normal height. There is no fracture. There is trace retrolisthesis of L4 on L5.  Alignment is otherwise normal. There is no significant disc space narrowing. The facet joints maintain normal articulation. There is mild facet joint arthropathy at the lower 2 lumbar levels.   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  Leisure activities: family time, home management  Prior Level of Function: independent  Current Level of Function/limitation: modified independent, increase time noted with home management  Recent or major surgery: none noted  Accidents: none noted    Pain:  Current 2/10, worst 4/10, best 0/10   Location: bilateral low back  Description: Aching, Dull, and Variable  Constant symptoms: no  Intermittent symptoms: yes  Worse: Sitting  Better:  unable to specify      Disturbed sleep: no  Is it positional? no  Unexplained weight loss: no    Pts goals: To decrease low back pain, work on posture and core/stability    Objective     Posture observation:  Sitting: lordotic/neutral/kyphotic: kyphotic  Change in posture: better/worse/same: better  Standing: lordotic/neutral/kyphotic: neutral                               Lateral shift: right/left/nil: nil  Shift relevant: yes/no: no    Neurological:  Sensation: Dermatomes     Right Left Comment   L2 (lateral thigh) intact intact    L3 (medial thigh) intact intact    L4 (medial calf) intact intact    L5 (lateral calf) intact intact    S1 (lateral foot) intact intact    S2 (gastro/HS) intact intact    Saddle (cauda equina) intact intact      Myotomes:   Right Left Comment   Hip flexion (L2-3): 5/5 5/5    Knee extension (L3-4): 5/5 5/5    DF (L4-5): 5/5 5/5    Great Toe Ext (L5-S1):   5/5 5/5    Glut Medius (L5)      Great Toe Flex/HS (S1-S2) 5/5 5/5      DTR:   Right Left Comment   Patellar (L3-4) 2+ 2+    Achilles (S1) 2+ 2+      Movement  Loss  Thoracic/Lumbar AROM:     % limitation Pain/Dysfunction/movement   Flexion  (55-60 N)    0   Non-uniform curvature     Extension (25 N)  25%   ASIS does not clear toes  Lack of posterior weight shift     Lumbar:   SLR (with leg dominant pain)-  Slump-  Clonus-    UMN testing:  Cross over sign: -    Resistive Test for Hamstring Syndrome: -  + if buttock pain produced and no pain with prone test.    Palpation for condition/position, PIVM:     GAIT: Elvia ambulates 50 feet with no assistive device with independently.     GAIT DEVIATIONS: No major observable deviations at this time    Transfers: independent    Pt/family was provided educational information, including: role of PT, goals for PT, scheduling - pt verbalized understanding. Discussed insurance limitations with pt.        CMS Impairment/Limitation/Restriction for FOTO Lumbar Spine Survey  Status Limitation G-Code CMS Severity Modifier  Intake 82% 18% Current Status CI - At least 1 percent but less than 20 percent  Predicted 84% 16% Goal Status+ CI - At least 1 percent but less than 20 percent       TREATMENT   Treatment Time In: 1640  Treatment Time Out: 1645  Total Treatment time separate from Evaluation: 5 minutes    Elvia received therapeutic exercises to develop strength, endurance, ROM, flexibility, posture, and core stabilization for 5 minutes including:  Seated: ergonomics  Supine TA, bridge  Extension in standing  Home Exercises and Patient Education Provided    Education provided:   - Yes    Written Home Exercises Provided: yes.  Exercises were reviewed and Elvia was able to demonstrate them prior to the end of the session.  Elvia demonstrated good  understanding of the education provided.     See EMR under Patient Instructions for exercises provided 3/13/2023.    Assessment   Elvia is a 28 y.o. female referred to outpatient Physical Therapy with a medical diagnosis of Back pain, unspecified back location, unspecified back pain laterality,  unspecified chronicity. Pt presents with low back pain, impaired postural awareness, decreased lumbar mobility, hip/core/spine weakness.    Problem List: pain, decreased ROM, decreased flexibility, decreased strength, decreased motor control, antalgic gait, and inability to participate fully in vocation pursuits.    Pt prognosis is Good.   Pt will benefit from skilled outpatient Physical Therapy to address the deficits stated above and in the chart below, provide pt/family education, and to maximize pt's level of independence.     Plan of care discussed with patient: Yes  Pt's spiritual, cultural and educational needs considered and patient is agreeable to the plan of care and goals as stated below:     Anticipated Barriers for therapy: none    Medical Necessity is demonstrated by the following  History  Co-morbidities and personal factors that may impact the plan of care Co-morbidities:   anxiety    Personal Factors:   no deficits     moderate   Examination  Body Structures and Functions, activity limitations and participation restrictions that may impact the plan of care Body Regions:   back  lower extremities  trunk    Body Systems:    ROM  strength  transfers  transitions  motor control    Participation Restrictions:   Home management    Activity limitations:   Learning and applying knowledge  no deficits    General Tasks and Commands  no deficits    Communication  no deficits    Mobility  lifting and carrying objects  walking    Self care  no deficits    Domestic Life  doing house work (cleaning house, washing dishes, laundry)    Interactions/Relationships  no deficits    Life Areas  no deficits    Community and Social Life  no deficits         high   Clinical Presentation stable and uncomplicated low   Decision Making/ Complexity Score: low     Short Term GOALS:  In 4 weeks, pt. will:  - improve lumbar motion by 25% for ADL tasks.  - improve hip/core MMT 1/2 grade for stability motor control purposes.  -  report > 25% reduction in low back painful episodes with home management and school work.  - demonstrate flexion in standing x 5 with no painful limitations.  - decrease outcome measure limitation to <18%    Long Term GOALS:  In 8 weeks, pt. will:  - be independent and compliant with HEP and SX management   - decrease outcome measure limitation to <15%  - demonstrate flexion in standing x 5 in the pm with no painful limitations.  - demonstrate hip/core MMT> 4+/5 with good stability motor control.  - report > 50% reduction in low back painful episodes with home management.    Plan   Plan of care Certification: 3/13/2023 to 05/15/2023.  Outpatient Physical Therapy 2 times weekly for 8 weeks to include the following interventions: Electrical Stimulation IFC, Gait Training, Manual Therapy, Moist Heat/ Ice, Neuromuscular Re-ed, Patient Education, Therapeutic Activities, Therapeutic Exercise, and Dry needling .      Elvia may at times be seen by a PTA as part of the Rehab Team.    William See, PT

## 2023-04-03 ENCOUNTER — PATIENT MESSAGE (OUTPATIENT)
Dept: REHABILITATION | Facility: HOSPITAL | Age: 28
End: 2023-04-03
Payer: COMMERCIAL

## 2023-04-27 RX ORDER — BUPROPION HYDROCHLORIDE 300 MG/1
300 TABLET ORAL DAILY
Qty: 30 TABLET | Refills: 11 | Status: SHIPPED | OUTPATIENT
Start: 2023-04-27 | End: 2023-11-21 | Stop reason: SDUPTHER

## 2023-04-27 RX ORDER — DEXTROAMPHETAMINE SACCHARATE, AMPHETAMINE ASPARTATE, DEXTROAMPHETAMINE SULFATE AND AMPHETAMINE SULFATE 1.25; 1.25; 1.25; 1.25 MG/1; MG/1; MG/1; MG/1
5 TABLET ORAL 2 TIMES DAILY PRN
Qty: 60 TABLET | Refills: 0 | Status: SHIPPED | OUTPATIENT
Start: 2023-04-27 | End: 2023-06-21 | Stop reason: SDUPTHER

## 2023-04-27 RX ORDER — DEXTROAMPHETAMINE SACCHARATE, AMPHETAMINE ASPARTATE MONOHYDRATE, DEXTROAMPHETAMINE SULFATE AND AMPHETAMINE SULFATE 5; 5; 5; 5 MG/1; MG/1; MG/1; MG/1
20 CAPSULE, EXTENDED RELEASE ORAL EVERY MORNING
Qty: 30 CAPSULE | Refills: 0 | Status: SHIPPED | OUTPATIENT
Start: 2023-04-27 | End: 2023-06-21 | Stop reason: SDUPTHER

## 2023-04-27 RX ORDER — FLUOXETINE 10 MG/1
10 CAPSULE ORAL DAILY
Qty: 30 CAPSULE | Refills: 11 | Status: SHIPPED | OUTPATIENT
Start: 2023-04-27 | End: 2023-11-21 | Stop reason: SDUPTHER

## 2023-04-27 NOTE — TELEPHONE ENCOUNTER
No new care gaps identified.  [facility] Embedded Care Gaps. Reference number: [id number]. 4/26/2023   8:03:35 PM CDT

## 2023-06-21 RX ORDER — DEXTROAMPHETAMINE SACCHARATE, AMPHETAMINE ASPARTATE MONOHYDRATE, DEXTROAMPHETAMINE SULFATE AND AMPHETAMINE SULFATE 5; 5; 5; 5 MG/1; MG/1; MG/1; MG/1
20 CAPSULE, EXTENDED RELEASE ORAL EVERY MORNING
Qty: 30 CAPSULE | Refills: 0 | Status: SHIPPED | OUTPATIENT
Start: 2023-06-21 | End: 2023-11-21 | Stop reason: SDUPTHER

## 2023-06-21 RX ORDER — DEXTROAMPHETAMINE SACCHARATE, AMPHETAMINE ASPARTATE, DEXTROAMPHETAMINE SULFATE AND AMPHETAMINE SULFATE 1.25; 1.25; 1.25; 1.25 MG/1; MG/1; MG/1; MG/1
5 TABLET ORAL 2 TIMES DAILY PRN
Qty: 60 TABLET | Refills: 0 | Status: SHIPPED | OUTPATIENT
Start: 2023-06-21 | End: 2023-11-21 | Stop reason: SDUPTHER

## 2023-06-21 NOTE — TELEPHONE ENCOUNTER
No care due was identified.  [facility] Embedded Care Due Messages. Reference number: [id number].   6/21/2023 8:41:37 AM CDT

## 2023-08-21 RX ORDER — DEXTROAMPHETAMINE SACCHARATE, AMPHETAMINE ASPARTATE MONOHYDRATE, DEXTROAMPHETAMINE SULFATE AND AMPHETAMINE SULFATE 5; 5; 5; 5 MG/1; MG/1; MG/1; MG/1
20 CAPSULE, EXTENDED RELEASE ORAL EVERY MORNING
Qty: 30 CAPSULE | Refills: 0 | OUTPATIENT
Start: 2023-08-21

## 2023-08-21 RX ORDER — DEXTROAMPHETAMINE SACCHARATE, AMPHETAMINE ASPARTATE, DEXTROAMPHETAMINE SULFATE AND AMPHETAMINE SULFATE 1.25; 1.25; 1.25; 1.25 MG/1; MG/1; MG/1; MG/1
5 TABLET ORAL 2 TIMES DAILY PRN
Qty: 60 TABLET | Refills: 0 | OUTPATIENT
Start: 2023-08-21

## 2023-08-22 NOTE — TELEPHONE ENCOUNTER
No care due was identified.  [facility] Embedded Care Due Messages. Reference number: [id number].   8/21/2023 7:53:47 PM CDT

## 2023-10-16 ENCOUNTER — ON-DEMAND VIRTUAL (OUTPATIENT)
Dept: URGENT CARE | Facility: CLINIC | Age: 28
End: 2023-10-16
Payer: COMMERCIAL

## 2023-10-16 DIAGNOSIS — H10.32 ACUTE CONJUNCTIVITIS OF LEFT EYE, UNSPECIFIED ACUTE CONJUNCTIVITIS TYPE: Primary | ICD-10-CM

## 2023-10-16 PROCEDURE — 99213 OFFICE O/P EST LOW 20 MIN: CPT | Mod: 95,,, | Performed by: NURSE PRACTITIONER

## 2023-10-16 PROCEDURE — 99213 PR OFFICE/OUTPT VISIT, EST, LEVL III, 20-29 MIN: ICD-10-PCS | Mod: 95,,, | Performed by: NURSE PRACTITIONER

## 2023-10-16 RX ORDER — TOBRAMYCIN 3 MG/ML
1 SOLUTION/ DROPS OPHTHALMIC EVERY 4 HOURS
Qty: 5 ML | Refills: 0 | Status: SHIPPED | OUTPATIENT
Start: 2023-10-16

## 2023-10-16 NOTE — PROGRESS NOTES
Subjective:      Patient ID: Elvia Alvarez is a 28 y.o. female.    Vitals:  vitals were not taken for this visit.     Chief Complaint: Conjunctivitis      Visit Type: TELE AUDIOVISUAL    Present with the patient at the time of consultation: TELEMED PRESENT WITH PATIENT: None    Past Medical History:   Diagnosis Date    Anxiety      Past Surgical History:   Procedure Laterality Date    gave birth  06/02/2021    TONSILLECTOMY       Review of patient's allergies indicates:  No Known Allergies  Current Outpatient Medications on File Prior to Visit   Medication Sig Dispense Refill    buPROPion (WELLBUTRIN XL) 300 MG 24 hr tablet Take 1 tablet (300 mg total) by mouth once daily. 30 tablet 11    dextroamphetamine-amphetamine (ADDERALL XR) 20 MG 24 hr capsule Take 1 capsule (20 mg total) by mouth every morning. 30 capsule 0    dextroamphetamine-amphetamine 5 mg Tab Take 5 mg by mouth 2 (two) times daily as needed. 60 tablet 0    FLUoxetine 10 MG capsule Take 1 capsule (10 mg total) by mouth once daily. 30 capsule 11    methylPREDNISolone (MEDROL DOSEPACK) 4 mg tablet Dispense one malik. use as directed (Patient not taking: Reported on 12/14/2021) 1 each 0    promethazine (PHENERGAN) 6.25 mg/5 mL syrup 10mL at night as needed (Patient not taking: Reported on 12/14/2021) 120 mL 1     No current facility-administered medications on file prior to visit.     Family History   Problem Relation Age of Onset    Hypertension Father     Heart disease Father     Suicide Sister     Diabetes Maternal Grandfather        Medications Ordered                [facility] [place] - [address] [address] [address]   [address] [address] [facility] [address]    Telephone: [phone]   Fax: [phone]   Hours: Not open 24 hours                         E-Prescribed (1 of 1)              tobramycin sulfate 0.3% (TOBREX) 0.3 % ophthalmic solution    Sig: Place 1 drop into the left eye every 4 (four) hours.       Start: 10/16/23     Quantity: 5 mL  Refills: 0                           [facility] Intake    10/16/2023  7:37 AM CDT - Filed by Patient   Describe your reason for todays visit Pink eye   What is your current physical address in the event of a medical emergency? [id number] [id number]   Are you able to take your vital signs? No   Please attach any relevant images or files          27 yo female with c/o left eye pink eye. She states she was sick several days ago. She states started two days ago. She states crusting and redness to eye. No change in vision. Denies burning and itching. She has some uri symptoms currently. She does not wear contacts.     Conjunctivitis  Associated symptoms include congestion and a sore throat.       Constitution: Negative.   HENT:  Positive for congestion, postnasal drip, sinus pressure and sore throat.    Cardiovascular: Negative.    Eyes:  Positive for eye discharge and eye redness. Negative for eye itching.   Respiratory: Negative.     Gastrointestinal: Negative.    Endocrine: negative.   Genitourinary: Negative.  Negative for frequency and urgency.   Musculoskeletal: Negative.    Skin: Negative.    Allergic/Immunologic: Negative.    Neurological: Negative.    Hematologic/Lymphatic: Negative.    Psychiatric/Behavioral: Negative.          Objective:   The physical exam was conducted virtually.  Physical Exam   Constitutional: She is oriented to person, place, and time. She appears well-developed.   HENT:   Head: Normocephalic and atraumatic.   Ears:   Right Ear: Hearing, tympanic membrane and external ear normal.   Left Ear: Hearing, tympanic membrane and external ear normal.   Nose: Nose normal.   Mouth/Throat: Uvula is midline, oropharynx is clear and moist and mucous membranes are normal.   Eyes: EOM and lids are normal. Pupils are equal, round, and reactive to light. Lids are everted and swept, no foreign bodies found. Left eye exhibits discharge and exudate. Left conjunctiva is injected. vision  grossly intact gaze aligned appropriately   Neck: Neck supple.   Cardiovascular: Normal rate.   Pulmonary/Chest: Effort normal and breath sounds normal.   Musculoskeletal: Normal range of motion.         General: Normal range of motion.   Neurological: She is alert and oriented to person, place, and time.   Skin: Skin is warm.   Psychiatric: Her behavior is normal. Thought content normal.   Nursing note and vitals reviewed.      Assessment:     1. Acute conjunctivitis of left eye, unspecified acute conjunctivitis type        Plan:     Patient Instructions   Follow up if symptoms are not improving.      Acute conjunctivitis of left eye, unspecified acute conjunctivitis type  -     tobramycin sulfate 0.3% (TOBREX) 0.3 % ophthalmic solution; Place 1 drop into the left eye every 4 (four) hours.  Dispense: 5 mL; Refill: 0

## 2023-11-21 ENCOUNTER — LAB VISIT (OUTPATIENT)
Dept: LAB | Facility: HOSPITAL | Age: 28
End: 2023-11-21
Attending: INTERNAL MEDICINE
Payer: COMMERCIAL

## 2023-11-21 ENCOUNTER — OFFICE VISIT (OUTPATIENT)
Dept: FAMILY MEDICINE | Facility: CLINIC | Age: 28
End: 2023-11-21
Payer: COMMERCIAL

## 2023-11-21 VITALS
HEIGHT: 66 IN | HEART RATE: 105 BPM | BODY MASS INDEX: 17.91 KG/M2 | SYSTOLIC BLOOD PRESSURE: 110 MMHG | OXYGEN SATURATION: 98 % | WEIGHT: 111.44 LBS | DIASTOLIC BLOOD PRESSURE: 60 MMHG

## 2023-11-21 DIAGNOSIS — Z00.00 ANNUAL PHYSICAL EXAM: ICD-10-CM

## 2023-11-21 DIAGNOSIS — F90.9 ATTENTION DEFICIT HYPERACTIVITY DISORDER (ADHD), UNSPECIFIED ADHD TYPE: Primary | ICD-10-CM

## 2023-11-21 LAB
ALBUMIN SERPL BCP-MCNC: 4.6 G/DL (ref 3.5–5.2)
ALP SERPL-CCNC: 45 U/L (ref 55–135)
ALT SERPL W/O P-5'-P-CCNC: 7 U/L (ref 10–44)
ANION GAP SERPL CALC-SCNC: 10 MMOL/L (ref 8–16)
AST SERPL-CCNC: 12 U/L (ref 10–40)
BASOPHILS # BLD AUTO: 0.03 K/UL (ref 0–0.2)
BASOPHILS NFR BLD: 0.5 % (ref 0–1.9)
BILIRUB SERPL-MCNC: 0.6 MG/DL (ref 0.1–1)
BUN SERPL-MCNC: 10 MG/DL (ref 6–20)
CALCIUM SERPL-MCNC: 9.7 MG/DL (ref 8.7–10.5)
CHLORIDE SERPL-SCNC: 103 MMOL/L (ref 95–110)
CHOLEST SERPL-MCNC: 153 MG/DL (ref 120–199)
CHOLEST/HDLC SERPL: 3.3 {RATIO} (ref 2–5)
CO2 SERPL-SCNC: 25 MMOL/L (ref 23–29)
CREAT SERPL-MCNC: 0.7 MG/DL (ref 0.5–1.4)
DIFFERENTIAL METHOD: NORMAL
EOSINOPHIL # BLD AUTO: 0.2 K/UL (ref 0–0.5)
EOSINOPHIL NFR BLD: 3 % (ref 0–8)
ERYTHROCYTE [DISTWIDTH] IN BLOOD BY AUTOMATED COUNT: 13.2 % (ref 11.5–14.5)
EST. GFR  (NO RACE VARIABLE): >60 ML/MIN/1.73 M^2
GLUCOSE SERPL-MCNC: 82 MG/DL (ref 70–110)
HCT VFR BLD AUTO: 40.5 % (ref 37–48.5)
HDLC SERPL-MCNC: 47 MG/DL (ref 40–75)
HDLC SERPL: 30.7 % (ref 20–50)
HGB BLD-MCNC: 13.3 G/DL (ref 12–16)
IMM GRANULOCYTES # BLD AUTO: 0.01 K/UL (ref 0–0.04)
IMM GRANULOCYTES NFR BLD AUTO: 0.2 % (ref 0–0.5)
LDLC SERPL CALC-MCNC: 95.2 MG/DL (ref 63–159)
LYMPHOCYTES # BLD AUTO: 1.6 K/UL (ref 1–4.8)
LYMPHOCYTES NFR BLD: 25.7 % (ref 18–48)
MCH RBC QN AUTO: 28.7 PG (ref 27–31)
MCHC RBC AUTO-ENTMCNC: 32.8 G/DL (ref 32–36)
MCV RBC AUTO: 87 FL (ref 82–98)
MONOCYTES # BLD AUTO: 0.4 K/UL (ref 0.3–1)
MONOCYTES NFR BLD: 6.9 % (ref 4–15)
NEUTROPHILS # BLD AUTO: 4 K/UL (ref 1.8–7.7)
NEUTROPHILS NFR BLD: 63.7 % (ref 38–73)
NONHDLC SERPL-MCNC: 106 MG/DL
NRBC BLD-RTO: 0 /100 WBC
PLATELET # BLD AUTO: 264 K/UL (ref 150–450)
PMV BLD AUTO: 10.6 FL (ref 9.2–12.9)
POTASSIUM SERPL-SCNC: 4.8 MMOL/L (ref 3.5–5.1)
PROT SERPL-MCNC: 7.1 G/DL (ref 6–8.4)
RBC # BLD AUTO: 4.64 M/UL (ref 4–5.4)
SODIUM SERPL-SCNC: 138 MMOL/L (ref 136–145)
TRIGL SERPL-MCNC: 54 MG/DL (ref 30–150)
TSH SERPL DL<=0.005 MIU/L-ACNC: 0.79 UIU/ML (ref 0.4–4)
WBC # BLD AUTO: 6.23 K/UL (ref 3.9–12.7)

## 2023-11-21 PROCEDURE — 99999 PR PBB SHADOW E&M-EST. PATIENT-LVL III: CPT | Mod: PBBFAC,,, | Performed by: INTERNAL MEDICINE

## 2023-11-21 PROCEDURE — 99395 PR PREVENTIVE VISIT,EST,18-39: ICD-10-PCS | Mod: S$GLB,,, | Performed by: INTERNAL MEDICINE

## 2023-11-21 PROCEDURE — 99395 PREV VISIT EST AGE 18-39: CPT | Mod: S$GLB,,, | Performed by: INTERNAL MEDICINE

## 2023-11-21 PROCEDURE — 3074F SYST BP LT 130 MM HG: CPT | Mod: CPTII,S$GLB,, | Performed by: INTERNAL MEDICINE

## 2023-11-21 PROCEDURE — 85025 COMPLETE CBC W/AUTO DIFF WBC: CPT | Performed by: INTERNAL MEDICINE

## 2023-11-21 PROCEDURE — 1159F PR MEDICATION LIST DOCUMENTED IN MEDICAL RECORD: ICD-10-PCS | Mod: CPTII,S$GLB,, | Performed by: INTERNAL MEDICINE

## 2023-11-21 PROCEDURE — 99999 PR PBB SHADOW E&M-EST. PATIENT-LVL III: ICD-10-PCS | Mod: PBBFAC,,, | Performed by: INTERNAL MEDICINE

## 2023-11-21 PROCEDURE — 80053 COMPREHEN METABOLIC PANEL: CPT | Performed by: INTERNAL MEDICINE

## 2023-11-21 PROCEDURE — 80061 LIPID PANEL: CPT | Performed by: INTERNAL MEDICINE

## 2023-11-21 PROCEDURE — 36415 COLL VENOUS BLD VENIPUNCTURE: CPT | Mod: PO | Performed by: INTERNAL MEDICINE

## 2023-11-21 PROCEDURE — 3008F PR BODY MASS INDEX (BMI) DOCUMENTED: ICD-10-PCS | Mod: CPTII,S$GLB,, | Performed by: INTERNAL MEDICINE

## 2023-11-21 PROCEDURE — 3078F PR MOST RECENT DIASTOLIC BLOOD PRESSURE < 80 MM HG: ICD-10-PCS | Mod: CPTII,S$GLB,, | Performed by: INTERNAL MEDICINE

## 2023-11-21 PROCEDURE — 84443 ASSAY THYROID STIM HORMONE: CPT | Performed by: INTERNAL MEDICINE

## 2023-11-21 PROCEDURE — 3074F PR MOST RECENT SYSTOLIC BLOOD PRESSURE < 130 MM HG: ICD-10-PCS | Mod: CPTII,S$GLB,, | Performed by: INTERNAL MEDICINE

## 2023-11-21 PROCEDURE — 3078F DIAST BP <80 MM HG: CPT | Mod: CPTII,S$GLB,, | Performed by: INTERNAL MEDICINE

## 2023-11-21 PROCEDURE — 3008F BODY MASS INDEX DOCD: CPT | Mod: CPTII,S$GLB,, | Performed by: INTERNAL MEDICINE

## 2023-11-21 PROCEDURE — 1159F MED LIST DOCD IN RCRD: CPT | Mod: CPTII,S$GLB,, | Performed by: INTERNAL MEDICINE

## 2023-11-21 RX ORDER — BUPROPION HYDROCHLORIDE 300 MG/1
300 TABLET ORAL DAILY
Qty: 30 TABLET | Refills: 11 | Status: SHIPPED | OUTPATIENT
Start: 2023-11-21 | End: 2024-01-22 | Stop reason: SDUPTHER

## 2023-11-21 RX ORDER — FLUOXETINE 10 MG/1
10 CAPSULE ORAL DAILY
Qty: 30 CAPSULE | Refills: 11 | Status: SHIPPED | OUTPATIENT
Start: 2023-11-21 | End: 2024-01-22 | Stop reason: SDUPTHER

## 2023-11-21 RX ORDER — DEXTROAMPHETAMINE SACCHARATE, AMPHETAMINE ASPARTATE MONOHYDRATE, DEXTROAMPHETAMINE SULFATE AND AMPHETAMINE SULFATE 5; 5; 5; 5 MG/1; MG/1; MG/1; MG/1
20 CAPSULE, EXTENDED RELEASE ORAL EVERY MORNING
Qty: 30 CAPSULE | Refills: 0 | Status: SHIPPED | OUTPATIENT
Start: 2023-11-21 | End: 2024-01-22 | Stop reason: SDUPTHER

## 2023-11-21 RX ORDER — DEXTROAMPHETAMINE SACCHARATE, AMPHETAMINE ASPARTATE, DEXTROAMPHETAMINE SULFATE AND AMPHETAMINE SULFATE 1.25; 1.25; 1.25; 1.25 MG/1; MG/1; MG/1; MG/1
5 TABLET ORAL 2 TIMES DAILY PRN
Qty: 60 TABLET | Refills: 0 | Status: SHIPPED | OUTPATIENT
Start: 2023-11-21 | End: 2024-01-22 | Stop reason: SDUPTHER

## 2023-11-21 NOTE — PROGRESS NOTES
Subjective     Patient ID: Elvia Alvarez is a 28 y.o. female.    Chief Complaint: Medication Refill    Pt her efor annual.d ue labs. Utd gyn exam. Nonsmoker. Due flu shot      Review of Systems   Constitutional:  Negative for activity change and unexpected weight change.   HENT:  Negative for hearing loss, rhinorrhea and trouble swallowing.    Eyes:  Negative for discharge and visual disturbance.   Respiratory:  Negative for chest tightness and wheezing.    Cardiovascular:  Negative for chest pain and palpitations.   Gastrointestinal:  Negative for blood in stool, constipation, diarrhea and vomiting.   Endocrine: Negative for polydipsia and polyuria.   Genitourinary:  Negative for difficulty urinating, dysuria, hematuria and menstrual problem.   Musculoskeletal:  Negative for arthralgias, joint swelling and neck pain.   Neurological:  Negative for weakness and headaches.   Psychiatric/Behavioral:  Positive for dysphoric mood. Negative for confusion.           Objective     Physical Exam  Constitutional:       Appearance: Normal appearance.   HENT:      Head: Normocephalic.   Cardiovascular:      Rate and Rhythm: Normal rate and regular rhythm.   Pulmonary:      Effort: Pulmonary effort is normal.      Breath sounds: Normal breath sounds.   Musculoskeletal:         General: Normal range of motion.      Cervical back: Normal range of motion.   Neurological:      General: No focal deficit present.      Mental Status: She is alert.   Psychiatric:         Mood and Affect: Mood normal.         Behavior: Behavior normal.            Assessment and Plan     1. Attention deficit hyperactivity disorder (ADHD), unspecified ADHD type    Other orders  -     buPROPion (WELLBUTRIN XL) 300 MG 24 hr tablet; Take 1 tablet (300 mg total) by mouth once daily.  Dispense: 30 tablet; Refill: 11  -     dextroamphetamine-amphetamine (ADDERALL XR) 20 MG 24 hr capsule; Take 1 capsule (20 mg total) by mouth every morning.  Dispense: 30  capsule; Refill: 0  -     dextroamphetamine-amphetamine 5 mg Tab; Take 5 mg by mouth 2 (two) times daily as needed.  Dispense: 60 tablet; Refill: 0  -     FLUoxetine 10 MG capsule; Take 1 capsule (10 mg total) by mouth once daily.  Dispense: 30 capsule; Refill: 11        Annual- flu shot and labs today         No follow-ups on file.

## 2024-01-04 ENCOUNTER — PATIENT MESSAGE (OUTPATIENT)
Dept: ADMINISTRATIVE | Facility: HOSPITAL | Age: 29
End: 2024-01-04
Payer: COMMERCIAL

## 2024-01-23 RX ORDER — DEXTROAMPHETAMINE SACCHARATE, AMPHETAMINE ASPARTATE MONOHYDRATE, DEXTROAMPHETAMINE SULFATE AND AMPHETAMINE SULFATE 5; 5; 5; 5 MG/1; MG/1; MG/1; MG/1
20 CAPSULE, EXTENDED RELEASE ORAL EVERY MORNING
Qty: 30 CAPSULE | Refills: 0 | Status: SHIPPED | OUTPATIENT
Start: 2024-01-23 | End: 2024-03-20 | Stop reason: SDUPTHER

## 2024-01-23 RX ORDER — DEXTROAMPHETAMINE SACCHARATE, AMPHETAMINE ASPARTATE, DEXTROAMPHETAMINE SULFATE AND AMPHETAMINE SULFATE 1.25; 1.25; 1.25; 1.25 MG/1; MG/1; MG/1; MG/1
5 TABLET ORAL 2 TIMES DAILY PRN
Qty: 60 TABLET | Refills: 0 | Status: SHIPPED | OUTPATIENT
Start: 2024-01-23 | End: 2024-03-20 | Stop reason: SDUPTHER

## 2024-01-23 RX ORDER — FLUOXETINE 10 MG/1
10 CAPSULE ORAL DAILY
Qty: 30 CAPSULE | Refills: 11 | Status: SHIPPED | OUTPATIENT
Start: 2024-01-23 | End: 2025-01-22

## 2024-01-23 RX ORDER — BUPROPION HYDROCHLORIDE 300 MG/1
300 TABLET ORAL DAILY
Qty: 30 TABLET | Refills: 11 | Status: SHIPPED | OUTPATIENT
Start: 2024-01-23 | End: 2025-01-22

## 2024-01-23 NOTE — TELEPHONE ENCOUNTER
No care due was identified.  [facility] Embedded Care Due Messages. Reference number: [id number].   1/22/2024 9:02:06 PM CST

## 2024-03-20 ENCOUNTER — OFFICE VISIT (OUTPATIENT)
Dept: FAMILY MEDICINE | Facility: CLINIC | Age: 29
End: 2024-03-20
Payer: COMMERCIAL

## 2024-03-20 VITALS
BODY MASS INDEX: 16.54 KG/M2 | WEIGHT: 102.94 LBS | HEIGHT: 66 IN | DIASTOLIC BLOOD PRESSURE: 66 MMHG | HEART RATE: 127 BPM | OXYGEN SATURATION: 97 % | SYSTOLIC BLOOD PRESSURE: 110 MMHG

## 2024-03-20 DIAGNOSIS — F90.9 ATTENTION DEFICIT HYPERACTIVITY DISORDER (ADHD), UNSPECIFIED ADHD TYPE: Primary | ICD-10-CM

## 2024-03-20 PROCEDURE — 3078F DIAST BP <80 MM HG: CPT | Mod: CPTII,S$GLB,, | Performed by: INTERNAL MEDICINE

## 2024-03-20 PROCEDURE — 3008F BODY MASS INDEX DOCD: CPT | Mod: CPTII,S$GLB,, | Performed by: INTERNAL MEDICINE

## 2024-03-20 PROCEDURE — 99999 PR PBB SHADOW E&M-EST. PATIENT-LVL III: CPT | Mod: PBBFAC,,, | Performed by: INTERNAL MEDICINE

## 2024-03-20 PROCEDURE — 99213 OFFICE O/P EST LOW 20 MIN: CPT | Mod: S$GLB,,, | Performed by: INTERNAL MEDICINE

## 2024-03-20 PROCEDURE — 1159F MED LIST DOCD IN RCRD: CPT | Mod: CPTII,S$GLB,, | Performed by: INTERNAL MEDICINE

## 2024-03-20 PROCEDURE — 3074F SYST BP LT 130 MM HG: CPT | Mod: CPTII,S$GLB,, | Performed by: INTERNAL MEDICINE

## 2024-03-20 RX ORDER — DEXTROAMPHETAMINE SACCHARATE, AMPHETAMINE ASPARTATE, DEXTROAMPHETAMINE SULFATE AND AMPHETAMINE SULFATE 1.25; 1.25; 1.25; 1.25 MG/1; MG/1; MG/1; MG/1
5 TABLET ORAL 2 TIMES DAILY PRN
Qty: 60 TABLET | Refills: 0 | Status: SHIPPED | OUTPATIENT
Start: 2024-03-20 | End: 2024-05-05 | Stop reason: SDUPTHER

## 2024-03-20 RX ORDER — DEXTROAMPHETAMINE SACCHARATE, AMPHETAMINE ASPARTATE MONOHYDRATE, DEXTROAMPHETAMINE SULFATE AND AMPHETAMINE SULFATE 5; 5; 5; 5 MG/1; MG/1; MG/1; MG/1
20 CAPSULE, EXTENDED RELEASE ORAL EVERY MORNING
Qty: 30 CAPSULE | Refills: 0 | Status: SHIPPED | OUTPATIENT
Start: 2024-03-20 | End: 2024-05-05 | Stop reason: SDUPTHER

## 2024-03-20 NOTE — PROGRESS NOTES
Subjective     Patient ID: Elvia Alvarez is a 29 y.o. female.    Chief Complaint: Medication Refill    Pt here for adhd check up. Doing well on adderall, no issues.  Has big test today      Review of Systems   Constitutional:  Negative for fever.   Respiratory:  Negative for shortness of breath.    Cardiovascular:  Negative for chest pain.   Neurological:  Negative for headaches.          Objective     Physical Exam  Constitutional:       Appearance: Normal appearance.   HENT:      Head: Normocephalic.   Musculoskeletal:         General: Normal range of motion.      Cervical back: Normal range of motion.   Neurological:      General: No focal deficit present.      Mental Status: She is alert.   Psychiatric:         Mood and Affect: Mood normal.         Behavior: Behavior normal.            Assessment and Plan     1. Attention deficit hyperactivity disorder (ADHD), unspecified ADHD type    Other orders  -     dextroamphetamine-amphetamine (ADDERALL XR) 20 MG 24 hr capsule; Take 1 capsule (20 mg total) by mouth every morning.  Dispense: 30 capsule; Refill: 0  -     dextroamphetamine-amphetamine 5 mg Tab; Take 5 mg by mouth 2 (two) times daily as needed.  Dispense: 60 tablet; Refill: 0        Refilled meds, stable. Reviewed pmr         Follow up in about 3 months (around 6/20/2024).

## 2024-03-20 NOTE — PROGRESS NOTES
Subjective     Patient ID: Elvia Alvarez is a 29 y.o. female.    Chief Complaint: Medication Refill    HPI  Review of Systems       Objective     Physical Exam       Assessment and Plan     {There are no diagnoses linked to this encounter. (Refresh or delete this SmartLink)}    ***         No follow-ups on file.

## 2024-04-11 ENCOUNTER — PATIENT MESSAGE (OUTPATIENT)
Dept: ADMINISTRATIVE | Facility: HOSPITAL | Age: 29
End: 2024-04-11
Payer: COMMERCIAL

## 2024-05-05 RX ORDER — BUPROPION HYDROCHLORIDE 300 MG/1
300 TABLET ORAL DAILY
Qty: 30 TABLET | Refills: 11 | OUTPATIENT
Start: 2024-05-05 | End: 2025-05-05

## 2024-05-05 RX ORDER — FLUOXETINE 10 MG/1
10 CAPSULE ORAL DAILY
Qty: 30 CAPSULE | Refills: 11 | OUTPATIENT
Start: 2024-05-05 | End: 2025-05-05

## 2024-05-05 NOTE — TELEPHONE ENCOUNTER
No care due was identified.  [facility] Embedded Care Due Messages. Reference number: [id number].   5/05/2024 11:08:47 AM CDT

## 2024-05-06 RX ORDER — DEXTROAMPHETAMINE SACCHARATE, AMPHETAMINE ASPARTATE MONOHYDRATE, DEXTROAMPHETAMINE SULFATE AND AMPHETAMINE SULFATE 5; 5; 5; 5 MG/1; MG/1; MG/1; MG/1
20 CAPSULE, EXTENDED RELEASE ORAL EVERY MORNING
Qty: 30 CAPSULE | Refills: 0 | Status: SHIPPED | OUTPATIENT
Start: 2024-05-06 | End: 2024-06-05

## 2024-05-06 RX ORDER — DEXTROAMPHETAMINE SACCHARATE, AMPHETAMINE ASPARTATE, DEXTROAMPHETAMINE SULFATE AND AMPHETAMINE SULFATE 1.25; 1.25; 1.25; 1.25 MG/1; MG/1; MG/1; MG/1
5 TABLET ORAL 2 TIMES DAILY PRN
Qty: 60 TABLET | Refills: 0 | Status: SHIPPED | OUTPATIENT
Start: 2024-05-06 | End: 2024-06-05

## 2024-07-19 ENCOUNTER — PATIENT MESSAGE (OUTPATIENT)
Dept: FAMILY MEDICINE | Facility: CLINIC | Age: 29
End: 2024-07-19
Payer: COMMERCIAL

## 2024-07-19 RX ORDER — DEXTROAMPHETAMINE SACCHARATE, AMPHETAMINE ASPARTATE, DEXTROAMPHETAMINE SULFATE AND AMPHETAMINE SULFATE 1.25; 1.25; 1.25; 1.25 MG/1; MG/1; MG/1; MG/1
5 TABLET ORAL 2 TIMES DAILY PRN
Qty: 60 TABLET | Refills: 0 | OUTPATIENT
Start: 2024-07-19 | End: 2024-08-18

## 2024-07-19 RX ORDER — FLUOXETINE 10 MG/1
10 CAPSULE ORAL DAILY
Qty: 30 CAPSULE | Refills: 11 | Status: SHIPPED | OUTPATIENT
Start: 2024-07-19 | End: 2025-07-19

## 2024-07-19 RX ORDER — BUPROPION HYDROCHLORIDE 300 MG/1
300 TABLET ORAL DAILY
Qty: 30 TABLET | Refills: 11 | Status: SHIPPED | OUTPATIENT
Start: 2024-07-19 | End: 2025-07-19

## 2024-07-19 NOTE — TELEPHONE ENCOUNTER
No care due was identified.  [facility] Embedded Care Due Messages. Reference number: [id number].   7/19/2024 8:03:50 AM CDT

## 2024-07-24 ENCOUNTER — TELEPHONE (OUTPATIENT)
Dept: FAMILY MEDICINE | Facility: CLINIC | Age: 29
End: 2024-07-24
Payer: COMMERCIAL

## 2024-07-25 ENCOUNTER — OFFICE VISIT (OUTPATIENT)
Dept: FAMILY MEDICINE | Facility: CLINIC | Age: 29
End: 2024-07-25
Payer: COMMERCIAL

## 2024-07-25 ENCOUNTER — PATIENT MESSAGE (OUTPATIENT)
Dept: FAMILY MEDICINE | Facility: CLINIC | Age: 29
End: 2024-07-25

## 2024-07-25 DIAGNOSIS — F98.8 ATTENTION DEFICIT DISORDER (ADD) WITHOUT HYPERACTIVITY: Primary | ICD-10-CM

## 2024-07-25 PROCEDURE — 99213 OFFICE O/P EST LOW 20 MIN: CPT | Mod: 95,,, | Performed by: FAMILY MEDICINE

## 2024-07-25 PROCEDURE — 1160F RVW MEDS BY RX/DR IN RCRD: CPT | Mod: CPTII,95,, | Performed by: FAMILY MEDICINE

## 2024-07-25 PROCEDURE — 1159F MED LIST DOCD IN RCRD: CPT | Mod: CPTII,95,, | Performed by: FAMILY MEDICINE

## 2024-07-25 RX ORDER — DEXTROAMPHETAMINE SACCHARATE, AMPHETAMINE ASPARTATE MONOHYDRATE, DEXTROAMPHETAMINE SULFATE AND AMPHETAMINE SULFATE 5; 5; 5; 5 MG/1; MG/1; MG/1; MG/1
20 CAPSULE, EXTENDED RELEASE ORAL EVERY MORNING
Qty: 30 CAPSULE | Refills: 0 | Status: SHIPPED | OUTPATIENT
Start: 2024-07-25

## 2024-07-25 RX ORDER — DEXTROAMPHETAMINE SACCHARATE, AMPHETAMINE ASPARTATE, DEXTROAMPHETAMINE SULFATE AND AMPHETAMINE SULFATE 1.25; 1.25; 1.25; 1.25 MG/1; MG/1; MG/1; MG/1
5 TABLET ORAL 2 TIMES DAILY PRN
Qty: 60 TABLET | Refills: 0 | Status: SHIPPED | OUTPATIENT
Start: 2024-07-25 | End: 2024-08-24

## 2024-09-17 DIAGNOSIS — F98.8 ATTENTION DEFICIT DISORDER (ADD) WITHOUT HYPERACTIVITY: ICD-10-CM

## 2024-09-17 RX ORDER — BUPROPION HYDROCHLORIDE 300 MG/1
300 TABLET ORAL DAILY
Qty: 30 TABLET | Refills: 11 | Status: SHIPPED | OUTPATIENT
Start: 2024-09-17 | End: 2025-09-17

## 2024-09-17 RX ORDER — FLUOXETINE 10 MG/1
10 CAPSULE ORAL DAILY
Qty: 30 CAPSULE | Refills: 11 | Status: SHIPPED | OUTPATIENT
Start: 2024-09-17 | End: 2025-09-17

## 2024-09-17 RX ORDER — DEXTROAMPHETAMINE SACCHARATE, AMPHETAMINE ASPARTATE MONOHYDRATE, DEXTROAMPHETAMINE SULFATE AND AMPHETAMINE SULFATE 5; 5; 5; 5 MG/1; MG/1; MG/1; MG/1
20 CAPSULE, EXTENDED RELEASE ORAL EVERY MORNING
Qty: 30 CAPSULE | Refills: 0 | Status: SHIPPED | OUTPATIENT
Start: 2024-09-17

## 2024-09-17 NOTE — TELEPHONE ENCOUNTER
No care due was identified.  [facility] Embedded Care Due Messages. Reference number: [id number].   9/17/2024 4:16:11 AM CDT

## 2024-09-17 NOTE — TELEPHONE ENCOUNTER
No care due was identified.  [facility] Embedded Care Due Messages. Reference number: [id number].   9/17/2024 4:16:30 AM CDT

## 2024-10-29 DIAGNOSIS — F98.8 ATTENTION DEFICIT DISORDER (ADD) WITHOUT HYPERACTIVITY: ICD-10-CM

## 2024-10-29 RX ORDER — DEXTROAMPHETAMINE SACCHARATE, AMPHETAMINE ASPARTATE MONOHYDRATE, DEXTROAMPHETAMINE SULFATE AND AMPHETAMINE SULFATE 5; 5; 5; 5 MG/1; MG/1; MG/1; MG/1
20 CAPSULE, EXTENDED RELEASE ORAL EVERY MORNING
Qty: 30 CAPSULE | Refills: 0 | Status: SHIPPED | OUTPATIENT
Start: 2024-10-29

## 2024-12-09 ENCOUNTER — E-VISIT (OUTPATIENT)
Dept: FAMILY MEDICINE | Facility: CLINIC | Age: 29
End: 2024-12-09
Payer: COMMERCIAL

## 2024-12-09 DIAGNOSIS — Z34.90 PREGNANCY, UNSPECIFIED GESTATIONAL AGE: Primary | ICD-10-CM

## 2024-12-09 NOTE — PROGRESS NOTES
Patient ID: Elvia Alvarez is a 29 y.o. female.    Chief Complaint: General Illness (Entered automatically based on patient selection in [employer].)    The patient initiated a request through [employer] on 12/9/2024 for evaluation and management with a chief complaint of General Illness (Entered automatically based on patient selection in [employer].)     I evaluated the questionnaire submission on 12/9/24  .    [facility]    12/9/2024  8:40 AM CST - Filed by Patient   What do you need help with? Other Concern   Do you agree to participate in an E-Visit? Yes   If you have any of the following symptoms, please present to your local emergency room or call 911:  I acknowledge   Do you have any of the following pregnancy-related conditions? Pregnant   Do you have any of the following symptoms? No   What is the main issue you would like addressed today? Different medication   Please describe your symptoms NA just a question   Where is your problem located? ? Maybe my brain?   How severe are your symptoms? Moderate   Have you had these symptoms before? Yes   How long have you been having these symptoms? For a few days   Please list any medications or treatments you have used for your condition and indicate if it was effective or not. Adderall, wellbutrin, and prozac   What makes this feel better? My medicine   What makes this feel worse? Not taking my medicine   Are these symptoms related to a condition that you currently have? Yes   What is the condition? I just want to know if i can switch my current medication so i can continue to have anti depressants and something to focus while pregnant   When were you last seen for this condition? 9/1/2024   Please describe any probable cause for these symptoms I dont think they are healthy to take while pregnant   Provide any additional information you feel is important.    Please attach any relevant images or files    Are you able to take your vital  signs? No         Encounter Diagnosis   Name Primary?    Pregnancy, unspecified gestational age Yes        No orders of the defined types were placed in this encounter.           No follow-ups on file.      E-Visit Time Tracking:    Day 1 Time (in minutes): 10    Total Time (in minutes): 10          Dc wellbutrin and adderall.  Continue fluoxetine

## 2025-08-08 PROBLEM — Z34.90 ENCOUNTER FOR ELECTIVE INDUCTION OF LABOR: Status: ACTIVE | Noted: 2025-08-08

## 2025-08-11 PROBLEM — Z34.90 ENCOUNTER FOR ELECTIVE INDUCTION OF LABOR: Status: RESOLVED | Noted: 2025-08-08 | Resolved: 2025-08-11

## 2025-08-19 ENCOUNTER — PATIENT MESSAGE (OUTPATIENT)
Dept: ADMINISTRATIVE | Facility: HOSPITAL | Age: 30
End: 2025-08-19
Payer: COMMERCIAL